# Patient Record
Sex: FEMALE | Race: ASIAN | NOT HISPANIC OR LATINO | Employment: FULL TIME | URBAN - METROPOLITAN AREA
[De-identification: names, ages, dates, MRNs, and addresses within clinical notes are randomized per-mention and may not be internally consistent; named-entity substitution may affect disease eponyms.]

---

## 2017-01-23 ENCOUNTER — PRENATAL OFFICE VISIT - HEALTHEAST (OUTPATIENT)
Dept: MIDWIFE SERVICES | Facility: CLINIC | Age: 27
End: 2017-01-23

## 2017-01-23 DIAGNOSIS — Z34.80 ENCOUNTER FOR SUPERVISION OF OTHER NORMAL PREGNANCY: ICD-10-CM

## 2017-01-23 ASSESSMENT — MIFFLIN-ST. JEOR: SCORE: 1127.78

## 2017-01-25 LAB — SYPHILIS RPR SCREEN - HISTORICAL: NORMAL

## 2017-02-15 ENCOUNTER — PRENATAL OFFICE VISIT - HEALTHEAST (OUTPATIENT)
Dept: MIDWIFE SERVICES | Facility: CLINIC | Age: 27
End: 2017-02-15

## 2017-02-15 DIAGNOSIS — O09.899 SHORT INTERVAL BETWEEN PREGNANCIES AFFECTING PREGNANCY, ANTEPARTUM: ICD-10-CM

## 2017-02-15 DIAGNOSIS — Z34.80 ENCOUNTER FOR SUPERVISION OF OTHER NORMAL PREGNANCY: ICD-10-CM

## 2017-02-15 ASSESSMENT — MIFFLIN-ST. JEOR: SCORE: 1145.92

## 2017-02-27 ENCOUNTER — PRENATAL OFFICE VISIT - HEALTHEAST (OUTPATIENT)
Dept: MIDWIFE SERVICES | Facility: CLINIC | Age: 27
End: 2017-02-27

## 2017-02-27 DIAGNOSIS — Z34.80 ENCOUNTER FOR SUPERVISION OF OTHER NORMAL PREGNANCY: ICD-10-CM

## 2017-02-27 ASSESSMENT — MIFFLIN-ST. JEOR: SCORE: 1145.92

## 2017-03-13 ENCOUNTER — PRENATAL OFFICE VISIT - HEALTHEAST (OUTPATIENT)
Dept: MIDWIFE SERVICES | Facility: CLINIC | Age: 27
End: 2017-03-13

## 2017-03-13 DIAGNOSIS — Z34.80 ENCOUNTER FOR SUPERVISION OF OTHER NORMAL PREGNANCY: ICD-10-CM

## 2017-03-13 ASSESSMENT — MIFFLIN-ST. JEOR: SCORE: 1164.07

## 2017-03-23 ENCOUNTER — PRENATAL OFFICE VISIT - HEALTHEAST (OUTPATIENT)
Dept: MIDWIFE SERVICES | Facility: CLINIC | Age: 27
End: 2017-03-23

## 2017-03-23 DIAGNOSIS — O09.899 SHORT INTERVAL BETWEEN PREGNANCIES AFFECTING PREGNANCY, ANTEPARTUM: ICD-10-CM

## 2017-03-23 DIAGNOSIS — Z34.83 ENCOUNTER FOR SUPERVISION OF OTHER NORMAL PREGNANCY, THIRD TRIMESTER: ICD-10-CM

## 2017-03-23 ASSESSMENT — MIFFLIN-ST. JEOR: SCORE: 1157.26

## 2017-04-03 ENCOUNTER — PRENATAL OFFICE VISIT - HEALTHEAST (OUTPATIENT)
Dept: MIDWIFE SERVICES | Facility: CLINIC | Age: 27
End: 2017-04-03

## 2017-04-03 DIAGNOSIS — Z34.83 ENCOUNTER FOR SUPERVISION OF OTHER NORMAL PREGNANCY, THIRD TRIMESTER: ICD-10-CM

## 2017-04-03 ASSESSMENT — MIFFLIN-ST. JEOR: SCORE: 1175.41

## 2017-04-10 ENCOUNTER — PRENATAL OFFICE VISIT - HEALTHEAST (OUTPATIENT)
Dept: MIDWIFE SERVICES | Facility: CLINIC | Age: 27
End: 2017-04-10

## 2017-04-10 DIAGNOSIS — Z34.80 ENCOUNTER FOR SUPERVISION OF OTHER NORMAL PREGNANCY: ICD-10-CM

## 2017-04-10 ASSESSMENT — MIFFLIN-ST. JEOR: SCORE: 1167.47

## 2017-04-17 ENCOUNTER — PRENATAL OFFICE VISIT - HEALTHEAST (OUTPATIENT)
Dept: MIDWIFE SERVICES | Facility: CLINIC | Age: 27
End: 2017-04-17

## 2017-04-17 DIAGNOSIS — Z34.83 ENCOUNTER FOR SUPERVISION OF OTHER NORMAL PREGNANCY, THIRD TRIMESTER: ICD-10-CM

## 2017-04-17 ASSESSMENT — MIFFLIN-ST. JEOR: SCORE: 1177.67

## 2018-05-07 ENCOUNTER — PRENATAL OFFICE VISIT - HEALTHEAST (OUTPATIENT)
Dept: FAMILY MEDICINE | Facility: CLINIC | Age: 28
End: 2018-05-07

## 2018-05-07 DIAGNOSIS — N92.6 ABNORMAL MENSES: ICD-10-CM

## 2018-05-07 DIAGNOSIS — Z32.01 POSITIVE PREGNANCY TEST: ICD-10-CM

## 2018-05-07 DIAGNOSIS — Z34.90 NORMAL PREGNANCY: ICD-10-CM

## 2018-05-07 DIAGNOSIS — R87.619 ABNORMAL PAP SMEAR OF CERVIX: ICD-10-CM

## 2018-05-07 LAB
ALBUMIN UR-MCNC: NEGATIVE MG/DL
AMPHETAMINES UR QL SCN: NORMAL
BARBITURATES UR QL: NORMAL
BASOPHILS # BLD AUTO: 0 THOU/UL (ref 0–0.2)
BASOPHILS NFR BLD AUTO: 0 % (ref 0–2)
BENZODIAZ UR QL: NORMAL
CANNABINOIDS UR QL SCN: NORMAL
COCAINE UR QL: NORMAL
CREAT UR-MCNC: 34.3 MG/DL
EOSINOPHIL # BLD AUTO: 0.1 THOU/UL (ref 0–0.4)
EOSINOPHIL NFR BLD AUTO: 1 % (ref 0–6)
ERYTHROCYTE [DISTWIDTH] IN BLOOD BY AUTOMATED COUNT: 13 % (ref 11–14.5)
GLUCOSE UR STRIP-MCNC: NEGATIVE MG/DL
HCG UR QL: POSITIVE
HCT VFR BLD AUTO: 35 % (ref 35–47)
HGB BLD-MCNC: 11.5 G/DL (ref 12–16)
HIV 1+2 AB+HIV1 P24 AG SERPL QL IA: NEGATIVE
KETONES UR STRIP-MCNC: NEGATIVE MG/DL
LYMPHOCYTES # BLD AUTO: 3.1 THOU/UL (ref 0.8–4.4)
LYMPHOCYTES NFR BLD AUTO: 35 % (ref 20–40)
MCH RBC QN AUTO: 28.8 PG (ref 27–34)
MCHC RBC AUTO-ENTMCNC: 32.9 G/DL (ref 32–36)
MCV RBC AUTO: 88 FL (ref 80–100)
MONOCYTES # BLD AUTO: 0.5 THOU/UL (ref 0–0.9)
MONOCYTES NFR BLD AUTO: 6 % (ref 2–10)
NEUTROPHILS # BLD AUTO: 5 THOU/UL (ref 2–7.7)
NEUTROPHILS NFR BLD AUTO: 58 % (ref 50–70)
OPIATES UR QL SCN: NORMAL
OXYCODONE UR QL: NORMAL
PCP UR QL SCN: NORMAL
PLATELET # BLD AUTO: 268 THOU/UL (ref 140–440)
PMV BLD AUTO: 9.6 FL (ref 8.5–12.5)
RBC # BLD AUTO: 4 MILL/UL (ref 3.8–5.4)
WBC: 8.7 THOU/UL (ref 4–11)

## 2018-05-07 ASSESSMENT — MIFFLIN-ST. JEOR: SCORE: 1032.52

## 2018-05-08 ENCOUNTER — AMBULATORY - HEALTHEAST (OUTPATIENT)
Dept: ADMINISTRATIVE | Facility: CLINIC | Age: 28
End: 2018-05-08

## 2018-05-08 DIAGNOSIS — Z32.01 POSITIVE PREGNANCY TEST: ICD-10-CM

## 2018-05-08 DIAGNOSIS — Z34.90 NORMAL PREGNANCY: ICD-10-CM

## 2018-05-08 LAB
ABO/RH(D): NORMAL
ABORH REPEAT: NORMAL
ANTIBODY SCREEN: NEGATIVE
BACTERIA SPEC CULT: NO GROWTH
COLLECTION METHOD: NORMAL
HBV SURFACE AG SERPL QL IA: NEGATIVE
LEAD BLD-MCNC: NORMAL UG/DL
LEAD RETEST: NO
RUBV IGG SERPL QL IA: POSITIVE
T PALLIDUM AB SER QL: NEGATIVE

## 2018-05-09 LAB
GUARDIAN FIRST NAME: NORMAL
GUARDIAN LAST NAME: NORMAL
HEALTH CARE PROVIDER CITY: NORMAL
HEALTH CARE PROVIDER NAME: NORMAL
HEALTH CARE PROVIDER PHONE: NORMAL
HEALTH CARE PROVIDER STATE: NORMAL
HEALTH CARE PROVIDER STREET ADDRESS: NORMAL
HEALTH CARE PROVIDER ZIP CODE: NORMAL
LEAD, B: 1.2 MCG/DL (ref 0–4.9)
PATIENT CITY: NORMAL
PATIENT COUNTY: NORMAL
PATIENT EMPLOYER: NORMAL
PATIENT ETHNICITY: NORMAL
PATIENT HOME PHONE: NORMAL
PATIENT OCCUPATION: NORMAL
PATIENT RACE: NORMAL
PATIENT STATE: NORMAL
PATIENT STREET ADDRESS: NORMAL
PATIENT ZIP CODE: NORMAL
SUBMITTING LABORATORY PHONE: NORMAL
VENOUS/CAPILLARY: NORMAL

## 2018-06-04 ENCOUNTER — PRENATAL OFFICE VISIT - HEALTHEAST (OUTPATIENT)
Dept: FAMILY MEDICINE | Facility: CLINIC | Age: 28
End: 2018-06-04

## 2018-06-04 DIAGNOSIS — Z34.92 NORMAL PREGNANCY IN SECOND TRIMESTER: ICD-10-CM

## 2018-06-04 LAB
ALBUMIN UR-MCNC: NEGATIVE MG/DL
GLUCOSE UR STRIP-MCNC: NEGATIVE MG/DL
KETONES UR STRIP-MCNC: NEGATIVE MG/DL

## 2018-06-04 ASSESSMENT — MIFFLIN-ST. JEOR: SCORE: 1055.2

## 2018-06-19 ENCOUNTER — COMMUNICATION - HEALTHEAST (OUTPATIENT)
Dept: FAMILY MEDICINE | Facility: CLINIC | Age: 28
End: 2018-06-19

## 2018-07-02 ENCOUNTER — PRENATAL OFFICE VISIT - HEALTHEAST (OUTPATIENT)
Dept: FAMILY MEDICINE | Facility: CLINIC | Age: 28
End: 2018-07-02

## 2018-07-02 DIAGNOSIS — Z34.92 SUPERVISION OF NORMAL PREGNANCY IN SECOND TRIMESTER: ICD-10-CM

## 2018-08-06 ENCOUNTER — PRENATAL OFFICE VISIT - HEALTHEAST (OUTPATIENT)
Dept: FAMILY MEDICINE | Facility: CLINIC | Age: 28
End: 2018-08-06

## 2018-08-06 DIAGNOSIS — Z34.92 ENCOUNTER FOR SUPERVISION OF NORMAL PREGNANCY IN SECOND TRIMESTER: ICD-10-CM

## 2018-08-06 LAB
ALBUMIN UR-MCNC: NEGATIVE MG/DL
ERYTHROCYTE [DISTWIDTH] IN BLOOD BY AUTOMATED COUNT: 12.9 % (ref 11–14.5)
FASTING STATUS PATIENT QL REPORTED: YES
GLUCOSE 1H P 50 G GLC PO SERPL-MCNC: 109 MG/DL (ref 70–139)
GLUCOSE UR STRIP-MCNC: NEGATIVE MG/DL
HCT VFR BLD AUTO: 34.9 % (ref 35–47)
HGB BLD-MCNC: 11.9 G/DL (ref 12–16)
KETONES UR STRIP-MCNC: NEGATIVE MG/DL
MCH RBC QN AUTO: 29.8 PG (ref 27–34)
MCHC RBC AUTO-ENTMCNC: 34 G/DL (ref 32–36)
MCV RBC AUTO: 88 FL (ref 80–100)
PLATELET # BLD AUTO: 262 THOU/UL (ref 140–440)
PMV BLD AUTO: 6.8 FL (ref 7–10)
RBC # BLD AUTO: 3.99 MILL/UL (ref 3.8–5.4)
WBC: 10.2 THOU/UL (ref 4–11)

## 2018-08-07 ENCOUNTER — COMMUNICATION - HEALTHEAST (OUTPATIENT)
Dept: FAMILY MEDICINE | Facility: CLINIC | Age: 28
End: 2018-08-07

## 2018-08-08 LAB — T PALLIDUM AB SER QL: NEGATIVE

## 2018-09-10 ENCOUNTER — PRENATAL OFFICE VISIT - HEALTHEAST (OUTPATIENT)
Dept: FAMILY MEDICINE | Facility: CLINIC | Age: 28
End: 2018-09-10

## 2018-09-10 DIAGNOSIS — Z34.92 ENCOUNTER FOR SUPERVISION OF NORMAL PREGNANCY IN SECOND TRIMESTER: ICD-10-CM

## 2018-09-10 LAB
ALBUMIN UR-MCNC: ABNORMAL MG/DL
GLUCOSE UR STRIP-MCNC: NEGATIVE MG/DL
KETONES UR STRIP-MCNC: NEGATIVE MG/DL

## 2018-09-24 ENCOUNTER — PRENATAL OFFICE VISIT - HEALTHEAST (OUTPATIENT)
Dept: FAMILY MEDICINE | Facility: CLINIC | Age: 28
End: 2018-09-24

## 2018-09-24 DIAGNOSIS — Z34.92 ENCOUNTER FOR SUPERVISION OF NORMAL PREGNANCY IN SECOND TRIMESTER: ICD-10-CM

## 2018-10-08 ENCOUNTER — PRENATAL OFFICE VISIT - HEALTHEAST (OUTPATIENT)
Dept: FAMILY MEDICINE | Facility: CLINIC | Age: 28
End: 2018-10-08

## 2018-10-08 DIAGNOSIS — Z34.92 ENCOUNTER FOR SUPERVISION OF NORMAL PREGNANCY IN SECOND TRIMESTER: ICD-10-CM

## 2018-10-10 LAB
ALLERGIC TO PENICILLIN: NO
GP B STREP DNA SPEC QL NAA+PROBE: NEGATIVE

## 2018-10-15 ENCOUNTER — PRENATAL OFFICE VISIT - HEALTHEAST (OUTPATIENT)
Dept: FAMILY MEDICINE | Facility: CLINIC | Age: 28
End: 2018-10-15

## 2018-10-15 DIAGNOSIS — Z34.92 ENCOUNTER FOR SUPERVISION OF NORMAL PREGNANCY IN SECOND TRIMESTER: ICD-10-CM

## 2018-10-22 ENCOUNTER — PRENATAL OFFICE VISIT - HEALTHEAST (OUTPATIENT)
Dept: FAMILY MEDICINE | Facility: CLINIC | Age: 28
End: 2018-10-22

## 2018-10-22 DIAGNOSIS — Z34.92 ENCOUNTER FOR SUPERVISION OF NORMAL PREGNANCY IN SECOND TRIMESTER: ICD-10-CM

## 2018-10-29 ENCOUNTER — PRENATAL OFFICE VISIT - HEALTHEAST (OUTPATIENT)
Dept: FAMILY MEDICINE | Facility: CLINIC | Age: 28
End: 2018-10-29

## 2018-10-29 DIAGNOSIS — Z34.92 ENCOUNTER FOR SUPERVISION OF NORMAL PREGNANCY IN SECOND TRIMESTER: ICD-10-CM

## 2018-11-06 ENCOUNTER — PRENATAL OFFICE VISIT - HEALTHEAST (OUTPATIENT)
Dept: FAMILY MEDICINE | Facility: CLINIC | Age: 28
End: 2018-11-06

## 2018-11-06 DIAGNOSIS — Z34.92 ENCOUNTER FOR SUPERVISION OF NORMAL PREGNANCY IN SECOND TRIMESTER: ICD-10-CM

## 2021-05-30 VITALS — BODY MASS INDEX: 24.35 KG/M2 | WEIGHT: 116 LBS | HEIGHT: 58 IN

## 2021-05-30 VITALS — WEIGHT: 118.5 LBS | BODY MASS INDEX: 24.87 KG/M2 | HEIGHT: 58 IN

## 2021-05-30 VITALS — HEIGHT: 58 IN | WEIGHT: 123 LBS | BODY MASS INDEX: 25.82 KG/M2

## 2021-05-30 VITALS — HEIGHT: 58 IN | BODY MASS INDEX: 24.35 KG/M2 | WEIGHT: 116 LBS

## 2021-05-30 VITALS — HEIGHT: 58 IN | BODY MASS INDEX: 25.19 KG/M2 | WEIGHT: 120 LBS

## 2021-05-30 VITALS — HEIGHT: 58 IN | WEIGHT: 120.75 LBS | BODY MASS INDEX: 25.35 KG/M2

## 2021-05-30 VITALS — HEIGHT: 58 IN | WEIGHT: 122.5 LBS | BODY MASS INDEX: 25.72 KG/M2

## 2021-05-30 VITALS — WEIGHT: 112 LBS | BODY MASS INDEX: 23.51 KG/M2 | HEIGHT: 58 IN

## 2021-06-01 VITALS — BODY MASS INDEX: 21.53 KG/M2 | WEIGHT: 103 LBS

## 2021-06-01 VITALS — BODY MASS INDEX: 23.03 KG/M2 | WEIGHT: 110.2 LBS

## 2021-06-01 VITALS — HEIGHT: 58 IN | WEIGHT: 91 LBS | BODY MASS INDEX: 19.1 KG/M2

## 2021-06-01 VITALS — WEIGHT: 96 LBS | BODY MASS INDEX: 20.15 KG/M2 | HEIGHT: 58 IN

## 2021-06-02 VITALS — BODY MASS INDEX: 24.24 KG/M2 | WEIGHT: 116 LBS

## 2021-06-02 VITALS — BODY MASS INDEX: 25.08 KG/M2 | WEIGHT: 120 LBS

## 2021-06-02 VITALS — BODY MASS INDEX: 24.66 KG/M2 | WEIGHT: 118 LBS

## 2021-06-02 VITALS — WEIGHT: 113.4 LBS | BODY MASS INDEX: 23.7 KG/M2

## 2021-06-02 VITALS — WEIGHT: 122 LBS | BODY MASS INDEX: 25.5 KG/M2

## 2021-06-02 VITALS — BODY MASS INDEX: 24.87 KG/M2 | WEIGHT: 119 LBS

## 2021-06-08 NOTE — PROGRESS NOTES
Arnie Peguero presents to clinic today for a routine prenatal visit. Feeling well overall.  Reports + fetal movement daily, reviewed fetal movement awareness and when to call. Denies any regular contractions, bleeding or LOF. Reports good appetite, eating small frequent meals and is hydrating adequately.  One hour GCT, hgb, RPR and UC today as it has not yet been collected in this pregnancy.  TDAP discussed, accepted and given today.  Has started getting things ready at home, has car seat.  Warning sxs and when to call reviewed, patient has CNM contact information.  Anticipatory guidance re: third trimester provided today.

## 2021-06-08 NOTE — PROGRESS NOTES
Arnie Peguero is here with professional  for a routine prenatal visit at 30w5d.  She has no concerns and is feeling well.  She is feeling fetal movement regularly. Denies vaginal bleeding/loss of fluid. Fetal maturity and expectations for fetal movement in the third trimester, how and when to do fetal kick counts and when to call CNM discussed. Appetite is normal. Interval weight gain is appropriate.  Reviewed weight gain chart with patient.  Reviewed 28 week lab results.  TdaP received at previous visit.  Pre-registration completed with CNM and .  Reviewed and provided patient with handouts.  Anticipatory guidance, warning signs (with emphasis on  labor signs and symptoms), when to call and CNM contact information reviewed.  RTC in 2 weeks.

## 2021-06-09 NOTE — PROGRESS NOTES
Arnie Peguero presents to clinic today by herself. With the assistance of a professional [facility]  she voices that she is feeling generally well and has no concerns. She denies any regular uterine contractions, vaginal bleeding, loss of fluid or changes in fetal movement. Reviewed no weight gain since her last visit. Arnie Peguero states she is eating 2-3 meals a day. Encouraged her to eat smaller and more frequent meals to assist with her overall weight gain. Total weight gain this pregnancy is appropriate for her pre-pregnancy BMI of 19.6. 32 week pregnancy checklist completed and 36 week checklist initiated. Reviewed sxs of  labor, danger signs and numbers to call. Arnie Peguero instructed to return to clinic in 2 weeks or sooner with any questions or concerns.    Patient was seen with student, JOE Arteaga who was present for learning. I personally assessed, examined and made clinical decisions reflected in the documentation. FELICITY Campos,CNM

## 2021-06-09 NOTE — PROGRESS NOTES
Arnie is here for a SPENCER visit accompanied by a [facility] . We reviewed her delivery preferences. States she will combination feed and she has a breast pump at home. She nursed her first baby for 1 week. She hopes to work until the end of March - 'sits and stands at work but no heavy lifting. Doesn't know name of company in [facility].' GBS culture and hemoglobin done. CX posterior, 1 cm, 30%, medium consistency, vertex -2 station - suture palpated. Encouraged slightly increased rest until 37 weeks. Given bottle of PNV to take 1 po daily. Discussed BC briefly and needs more explanation at future visit. Baby girl at home is 11 months old. She was born @ 41 wks and weighed 5# 7oz.  Hgb done today = 12.2 up from 11.6.

## 2021-06-09 NOTE — PROGRESS NOTES
Arnie Peguero presents to clinic today for a routine prenatal visit. Professional [facility]  present for today's visit.  Feeling well.  Reports + fetal movement daily. Denies any regular contractions, bleeding or LOF. Reports good appetite, eating smaller more frequent meals and is hydrating adequately, good interval and overall weight gain. Reviewed GBS, hgb and cervical exam at next visit, questions answered.  Has car seat for baby at home. Warning sxs and when to call reviewed, patient has CNM contact information.  Anticipatory guidance re: third trimester provided today.

## 2021-06-09 NOTE — PROGRESS NOTES
Arnie Peguero is here for SPENCER at 37w3d. Here with professional . Offers no complaints or questions today. Active fetal movement. Denies contractions, leaking water, vaginal bleeding. S=D. TWG appropriate. Reviewed 36 weeks labs: GBS neg and Hgb 12.2. Encouraged daily FMCs. Labor signs reviewed. Hoping for 24 hour hospital stay and will have 3 months off of work after baby comes. RTC 1 week.

## 2021-06-10 NOTE — PROGRESS NOTES
Arnie Pegeuro presents to clinic today for a routine prenatal visit. Professional [facility]  present for visit today. Feeling well, no concerns.  Reports + fetal movement daily. Denies any regular contractions, bleeding or LOF. Reports good appetite, eating regularly and hydrating adequately.  Had previous baby at 41+ weeks gestation, so not expecting labor anytime soon. Ready at home for baby.  Warning sxs and when to call reviewed, patient has CNM contact information.  Anticipatory guidance re: full term pregnancy provided today.

## 2021-06-10 NOTE — PROGRESS NOTES
Arnie Peguero is here with a professional  for a SPENCER 39 wk visit. Feeling well and feeling ready for baby at home.  First daughter will turn 2 yo 4/28/17. Will have lots of PP help at home. Mother in law, sister in law and .  Looked at wt gain chart--doing well. Reviewed labor instructions, [facility] reviewed. Doesn't know about which BCM yet.

## 2021-06-16 ENCOUNTER — COMMUNICATION - HEALTHEAST (OUTPATIENT)
Dept: FAMILY MEDICINE | Facility: CLINIC | Age: 31
End: 2021-06-16

## 2021-06-17 NOTE — PROGRESS NOTES
Chief Complaint:  Chief Complaint   Patient presents with     Pregnancy Confirmation     #3     HPI:   Arnie Peguero is a 27 y.o. female is here for pregnancy intake.  She is .  Patient's last menstrual period was 2018 (exact date).  Saw midwives for previous pregnancies.  Positive home pregnancy test in 2018.  history of abnormal pap in , no follow-up that I can find.    Past medical history: reviewed and updated.  Past Medical History:   Diagnosis Date     Abnormal Pap smear of cervix      First degree perineal laceration 2016     HPV (human papilloma virus) infection       (normal spontaneous vaginal delivery) 2016     Right lower lobe pneumonia 2016    Most likely community-acquired pneumonia: Azithromycin by mouth (Z-pack)     Urinary tract infection      No past surgical history on file.    Current Outpatient Prescriptions:      prenatal vit-iron fum-folic ac (PRENATAL VITAMIN) 27 mg iron- 0.8 mg Tab, Take 1 tablet by mouth once daily., Disp: 100 tablet, Rfl: 3    Social:  Social History   Substance Use Topics     Smoking status: Never Smoker     Smokeless tobacco: Never Used     Alcohol use No       OBJECTIVE:  No Known Allergies  Vitals:    18 1453   BP: 108/60   Pulse: 70   SpO2: 99%     Body mass index is 19.02 kg/(m^2).    Vital signs stable as recorded above  General: Patient is alert and oriented x 3, in no apparent distress  Fetal Exam: Fundal height was not palpable, fetal heart tones are heard at 145 bpm.    Results:  Results for orders placed or performed in visit on 18   Pregnancy (Beta-hCG, Qual), Urine   Result Value Ref Range    Pregnancy Test, Urine Positive (!) Negative   Urinalysis, OB Screen   Result Value Ref Range    Glucose, UA Negative Negative    Ketones, UA Negative Negative    Protein, UA Negative Negative mg/dL     Other screening pregnancy lab work is ordered and pending.    Patient scored a 8/30 on [place]  Depression  Screen.    Assessment and Plan:  1. Pregnancy Intake Appointment.  Arnie Peguero is 27 y.o. and .  Patient's last menstrual period was 2018 (exact date).  Estimated Date of Delivery: 18  She will be seeing Dr. German for OB care.  Screening pregnancy lab work was drawn.  Prenatal vitamins prescribed.  Problem list and current medications reviewed and updated.  Dating ultrasound ordered.  Prenatal info packet given.    2. Abnormal pap in 2016.  LSIL/mild dysplasia/KAITLIN I.  No follow-up that I can find in her chart.  Review with OB doc at First OB visit.    Follow up in 3 weeks.  Please see OB Episode for complete details.  Visit was approximately 40 minutes, greater than 50% of time spent in face-to-face counseling and coordination of care.    This dictation uses voice recognition software, which may contain typographical errors.

## 2021-06-18 NOTE — PROGRESS NOTES
First OB   Feeling well.   No Nausea  Good Appetite  No pelvic pain  No other concerns : none     Taking PNV: no, will start now!    No ctx, lof, bleeding, or discharge.  No HA or vision changes.      Labs/imaging reviewed: dating changed to 15 week US.   Discussed screening for aneuploidy and neural tube defects.  Declines.     Preformed physical exam : declines pelvic  Reviewed MARCO, past medical history, past surgical history, family history, genetic history, and previous obstetrical history.    Encouraged good nutrition, well balanced diet, regular activity.  Discussed foods to avoid.  And other applicable safety/health risks in pregnancy.    Hx of LGSIL: would like to wait on pap smear andre German

## 2021-06-19 NOTE — PROGRESS NOTES
Please call patient and inform:  Glucose test was negative. No signs of gestational diabetes. Hemoglobin is 11.9, basically normal- keep up the good work!

## 2021-06-19 NOTE — PROGRESS NOTES
No ctx, lof, bleeding, or discharge.  No HA or vision changes.  Good FM : yes    Labs/imaging reviewed: 20 wk US wnl, having a girl. 2 girls at home.   FOB: Ana Maria    No questions or concerns. F/u in 4 weeks- 1 hr GTT to be done at next visit.     Paola German

## 2021-06-19 NOTE — PROGRESS NOTES
No ctx, lof, bleeding, or discharge.  No HA or vision changes.  Good FM : yes  No questions or concerns today.    Plan today:   1 hour GGT, CBC and RPR  Discussed Tdap she would like to do that at the next visit  Would like to breast-feed for 1 month until she goes back to work.  Follow-up in 4 weeks.    Paola German

## 2021-06-20 NOTE — PROGRESS NOTES
No ctx, lof, bleeding, or discharge.  No HA or vision changes.  Good FM : yes  Asking for registration form for the hospital.    Plan today:   Discussed baby meds  GBS swab collected  SVE as above: Closed thick and high  Follow-up in 1 week    Paola German

## 2021-06-20 NOTE — PROGRESS NOTES
No ctx, lof, bleeding, or discharge.  No HA or vision changes.  Good FM : yes    Plan today:   tdap today  Natural labor  PPBC: undecided. Will discuss with .   Reviewed signs of  labor.   F/u in 2 weeks.     Handout given    Paola German

## 2021-06-20 NOTE — PROGRESS NOTES
No ctx, lof, bleeding, or discharge.  No HA or vision changes.  Good FM : yes  No questions or concerns today.     Plan today:   Measuring well.  Fetus feels vertex.  Follow-up in 2 weeks.  Discussed GBS swab to be done at next visit.    Paola German

## 2021-06-21 NOTE — PROGRESS NOTES
No ctx, lof, bleeding, or discharge.  No HA or vision changes.  Good FM : yes  No questions or concerns today    Plan today:   SVE as noted: Membranes swept with patient's permission.  Discussed my vacation schedule this week and on-call coverage.  Follow-up in 1 week.    Paola German

## 2021-06-21 NOTE — PROGRESS NOTES
No lof, bleeding, or discharge.  No HA or vision changes.  Good FM : yes  Had some contractions last night but they stopped today.      Plan today:   SVE as above: Did attempt to sweep membranes again today.  Discussed induction versus watchful waiting if goes postdates: She would like to wait for induction until 42 weeks.  We discussed risk increases between 41-42 weeks and need for  testing twice weekly.  We will set up BPP/ NST on Friday in case she is still pregnant.  She then has follow-up with me next Tuesday and we can do NST in clinic if needed.  Questions answered to patient's satisfaction.  Discussed methods of induction.    Paola German

## 2021-06-25 NOTE — TELEPHONE ENCOUNTER
New Appointment Needed  What is the reason for the visit:  Confirmation of pregnancy   Pregnancy Confirmation Appt Needed  When was the first day of your last menstrual cycle?: March but does not know that day   Have you done a home pregnancy test?: Yes, positive   Provider Preference: Maxi  How soon do you need to be seen?:ASAP on Friday if possible  Okay to leave a detailed message:Yes Needs ong

## 2021-06-26 NOTE — TELEPHONE ENCOUNTER
Left message #1 at [phone]. Postponing task out to a week and will try again. If patient returns call back, please help patient schedule an appointment per message below. Thanks!

## 2021-06-26 NOTE — TELEPHONE ENCOUNTER
Left message #2 at [phone]. Sending letter out and postponing task out to 2 weeks and will try again if an appointment hasn't been made. If patient returns call back, please help patient schedule an appointment per message below. Thanks!

## 2021-07-14 PROBLEM — Z34.90 PREGNANT: Status: RESOLVED | Noted: 2018-11-09 | Resolved: 2018-11-11

## 2021-07-14 PROBLEM — Z37.9 NORMAL LABOR: Status: RESOLVED | Noted: 2017-04-24 | Resolved: 2017-04-24

## 2021-07-14 PROBLEM — Z34.90 PREGNANT: Status: RESOLVED | Noted: 2017-04-24 | Resolved: 2017-04-24

## 2021-07-15 ENCOUNTER — TELEPHONE (OUTPATIENT)
Dept: FAMILY MEDICINE | Facility: CLINIC | Age: 31
End: 2021-07-15

## 2021-07-15 NOTE — TELEPHONE ENCOUNTER
Left message #3 at [phone]. If patient returns call back, please help patient schedule an appointment per message below. Thanks! We have made several attempts to contact patient by phone and letter to schedule an appointment. Unfortunately, our calls have not been returned and we were unable to schedule. At this time, we will no longer make an attempt to schedule this appointment. Completing task.

## 2021-07-15 NOTE — TELEPHONE ENCOUNTER
New appointment needed    Verito Silva 3 weeks ago        Left message #2 at [phone]. Sending letter out and postponing task out to 2 weeks and will try again if an appointment hasn't been made. If patient returns call back, please help patient schedule an appointment per message below. Thanks!            Documentation    Tea Neal 4 weeks ago   MY     Left message #1 at [phone]. Postponing task out to a week and will try again. If patient returns call back, please help patient schedule an appointment per message below. Thanks!            Documentation    Renetta Choudhary MA  [facility] Scheduling Registration Pool 4 weeks ago   DB     Please set appt          Documentation    Catherine Carbone routed conversation to [facility] Family Medicine/Ob Support Pool 4 weeks ago   Catherine Carbone 4 weeks ago        New Appointment Needed  What is the reason for the visit:  Confirmation of pregnancy   Pregnancy Confirmation Appt Needed  When was the first day of your last menstrual cycle?: March but does not know that day   Have you done a home pregnancy test?: Yes, positive   Provider Preference: Maxi  How soon do you need to be seen?:ASAP on Friday if possible  Okay to leave a detailed message:Yes Needs ong

## 2021-07-16 ENCOUNTER — TELEPHONE (OUTPATIENT)
Dept: FAMILY MEDICINE | Facility: CLINIC | Age: 31
End: 2021-07-16

## 2021-07-16 NOTE — TELEPHONE ENCOUNTER
New appointment needed    You routed conversation to [facility] Scheduling Registration Pool Yesterday (1:52 PM)   Verito Silva 3 weeks ago        Left message #2 at [phone]. Sending letter out and postponing task out to 2 weeks and will try again if an appointment hasn't been made. If patient returns call back, please help patient schedule an appointment per message below. Thanks!            Documentation    Tea Neal 4 weeks ago   MY     Left message #1 at [phone]. Postponing task out to a week and will try again. If patient returns call back, please help patient schedule an appointment per message below. Thanks!            Documentation    Renetta Choudhary MA  [facility] Scheduling Registration Pool 4 weeks ago   DB     Please set appt          Documentation    Catherine Carbone routed conversation to [facility] Family Medicine/Ob Support Pool 1 month ago   Catherine Carbone 1 month ago        New Appointment Needed  What is the reason for the visit:  Confirmation of pregnancy   Pregnancy Confirmation Appt Needed  When was the first day of your last menstrual cycle?: March but does not know that day   Have you done a home pregnancy test?: Yes, positive   Provider Preference: Maxi  How soon do you need to be seen?:ASAP on Friday if possible  Okay to leave a detailed message:Yes Needs ong

## 2021-07-16 NOTE — TELEPHONE ENCOUNTER
Left message #3 at [phone]. If patient returns call back, please help patient schedule an appointment per message below. Thanks! We have made several attempts to contact patient by phone and letter to schedule an appointment. Unfortunately, our calls have not been returned and we were unable to schedule. At this time, we will no longer make an attempt to schedule this appointment. Completing task.

## 2021-12-12 ENCOUNTER — HOSPITAL ENCOUNTER (INPATIENT)
Facility: HOSPITAL | Age: 31
LOS: 2 days | Discharge: HOME | End: 2021-12-14
Attending: OBSTETRICS & GYNECOLOGY | Admitting: OBSTETRICS & GYNECOLOGY
Payer: COMMERCIAL

## 2021-12-12 DIAGNOSIS — O48.0 41 WEEKS GESTATION OF PREGNANCY: Primary | ICD-10-CM

## 2021-12-12 DIAGNOSIS — K59.00 CONSTIPATION, UNSPECIFIED CONSTIPATION TYPE: ICD-10-CM

## 2021-12-12 DIAGNOSIS — Z3A.41 41 WEEKS GESTATION OF PREGNANCY: Primary | ICD-10-CM

## 2021-12-12 PROBLEM — Z34.90 PREGNANCY: Status: ACTIVE | Noted: 2021-12-12

## 2021-12-12 PROBLEM — Z36.89 ENCOUNTER FOR TRIAGE IN PREGNANT PATIENT: Status: ACTIVE | Noted: 2021-12-12

## 2021-12-12 LAB
ABO/RH(D): NORMAL
ANTIBODY SCREEN: NEGATIVE
BASOPHILS # BLD AUTO: 0.1 10E3/UL (ref 0–0.2)
BASOPHILS NFR BLD AUTO: 1 %
EOSINOPHIL # BLD AUTO: 0.1 10E3/UL (ref 0–0.7)
EOSINOPHIL NFR BLD AUTO: 1 %
ERYTHROCYTE [DISTWIDTH] IN BLOOD BY AUTOMATED COUNT: 13.6 % (ref 10–15)
HCT VFR BLD AUTO: 37.9 % (ref 35–47)
HGB BLD-MCNC: 12.6 G/DL (ref 11.7–15.7)
IMM GRANULOCYTES # BLD: 0.2 10E3/UL
IMM GRANULOCYTES NFR BLD: 2 %
LYMPHOCYTES # BLD AUTO: 2.8 10E3/UL (ref 0.8–5.3)
LYMPHOCYTES NFR BLD AUTO: 21 %
MCH RBC QN AUTO: 30 PG (ref 26.5–33)
MCHC RBC AUTO-ENTMCNC: 33.2 G/DL (ref 31.5–36.5)
MCV RBC AUTO: 90 FL (ref 78–100)
MONOCYTES # BLD AUTO: 1.1 10E3/UL (ref 0–1.3)
MONOCYTES NFR BLD AUTO: 8 %
NEUTROPHILS # BLD AUTO: 9.2 10E3/UL (ref 1.6–8.3)
NEUTROPHILS NFR BLD AUTO: 67 %
NRBC # BLD AUTO: 0 10E3/UL
NRBC BLD AUTO-RTO: 0 /100
PLATELET # BLD AUTO: 231 10E3/UL (ref 150–450)
RBC # BLD AUTO: 4.2 10E6/UL (ref 3.8–5.2)
SARS-COV-2 RNA RESP QL NAA+PROBE: NEGATIVE
SPECIMEN EXPIRATION DATE: NORMAL
WBC # BLD AUTO: 13.1 10E3/UL (ref 4–11)

## 2021-12-12 PROCEDURE — 250N000009 HC RX 250: Performed by: OBSTETRICS & GYNECOLOGY

## 2021-12-12 PROCEDURE — 87635 SARS-COV-2 COVID-19 AMP PRB: CPT | Performed by: OBSTETRICS & GYNECOLOGY

## 2021-12-12 PROCEDURE — 85025 COMPLETE CBC W/AUTO DIFF WBC: CPT | Performed by: OBSTETRICS & GYNECOLOGY

## 2021-12-12 PROCEDURE — 86780 TREPONEMA PALLIDUM: CPT | Performed by: OBSTETRICS & GYNECOLOGY

## 2021-12-12 PROCEDURE — 120N000001 HC R&B MED SURG/OB

## 2021-12-12 PROCEDURE — 36415 COLL VENOUS BLD VENIPUNCTURE: CPT | Performed by: OBSTETRICS & GYNECOLOGY

## 2021-12-12 PROCEDURE — 86900 BLOOD TYPING SEROLOGIC ABO: CPT | Performed by: OBSTETRICS & GYNECOLOGY

## 2021-12-12 RX ORDER — ONDANSETRON 2 MG/ML
4 INJECTION INTRAMUSCULAR; INTRAVENOUS EVERY 6 HOURS PRN
Status: DISCONTINUED | OUTPATIENT
Start: 2021-12-12 | End: 2021-12-13 | Stop reason: HOSPADM

## 2021-12-12 RX ORDER — IBUPROFEN 600 MG/1
600 TABLET, FILM COATED ORAL
Status: DISCONTINUED | OUTPATIENT
Start: 2021-12-12 | End: 2021-12-14 | Stop reason: HOSPADM

## 2021-12-12 RX ORDER — KETOROLAC TROMETHAMINE 30 MG/ML
30 INJECTION, SOLUTION INTRAMUSCULAR; INTRAVENOUS
Status: DISCONTINUED | OUTPATIENT
Start: 2021-12-12 | End: 2021-12-14 | Stop reason: HOSPADM

## 2021-12-12 RX ORDER — OXYTOCIN 10 [USP'U]/ML
INJECTION, SOLUTION INTRAMUSCULAR; INTRAVENOUS
Status: DISCONTINUED
Start: 2021-12-12 | End: 2021-12-12 | Stop reason: WASHOUT

## 2021-12-12 RX ORDER — NALOXONE HYDROCHLORIDE 0.4 MG/ML
0.4 INJECTION, SOLUTION INTRAMUSCULAR; INTRAVENOUS; SUBCUTANEOUS
Status: DISCONTINUED | OUTPATIENT
Start: 2021-12-12 | End: 2021-12-13 | Stop reason: HOSPADM

## 2021-12-12 RX ORDER — FENTANYL CITRATE 50 UG/ML
50-100 INJECTION, SOLUTION INTRAMUSCULAR; INTRAVENOUS
Status: DISCONTINUED | OUTPATIENT
Start: 2021-12-12 | End: 2021-12-13 | Stop reason: HOSPADM

## 2021-12-12 RX ORDER — LIDOCAINE HYDROCHLORIDE 10 MG/ML
INJECTION, SOLUTION EPIDURAL; INFILTRATION; INTRACAUDAL; PERINEURAL
Status: DISCONTINUED
Start: 2021-12-12 | End: 2021-12-13 | Stop reason: WASHOUT

## 2021-12-12 RX ORDER — OXYTOCIN 10 [USP'U]/ML
10 INJECTION, SOLUTION INTRAMUSCULAR; INTRAVENOUS
Status: DISCONTINUED | OUTPATIENT
Start: 2021-12-12 | End: 2021-12-14 | Stop reason: HOSPADM

## 2021-12-12 RX ORDER — LIDOCAINE 40 MG/G
CREAM TOPICAL
Status: DISCONTINUED | OUTPATIENT
Start: 2021-12-12 | End: 2021-12-12 | Stop reason: HOSPADM

## 2021-12-12 RX ORDER — MISOPROSTOL 200 UG/1
800 TABLET ORAL
Status: DISCONTINUED | OUTPATIENT
Start: 2021-12-12 | End: 2021-12-13 | Stop reason: HOSPADM

## 2021-12-12 RX ORDER — NALOXONE HYDROCHLORIDE 0.4 MG/ML
0.2 INJECTION, SOLUTION INTRAMUSCULAR; INTRAVENOUS; SUBCUTANEOUS
Status: DISCONTINUED | OUTPATIENT
Start: 2021-12-12 | End: 2021-12-13 | Stop reason: HOSPADM

## 2021-12-12 RX ORDER — PROCHLORPERAZINE MALEATE 10 MG
10 TABLET ORAL EVERY 6 HOURS PRN
Status: DISCONTINUED | OUTPATIENT
Start: 2021-12-12 | End: 2021-12-13 | Stop reason: HOSPADM

## 2021-12-12 RX ORDER — ACETAMINOPHEN 325 MG/1
650 TABLET ORAL EVERY 4 HOURS PRN
Status: DISCONTINUED | OUTPATIENT
Start: 2021-12-12 | End: 2021-12-13 | Stop reason: HOSPADM

## 2021-12-12 RX ORDER — METOCLOPRAMIDE HYDROCHLORIDE 5 MG/ML
10 INJECTION INTRAMUSCULAR; INTRAVENOUS EVERY 6 HOURS PRN
Status: DISCONTINUED | OUTPATIENT
Start: 2021-12-12 | End: 2021-12-13 | Stop reason: HOSPADM

## 2021-12-12 RX ORDER — CARBOPROST TROMETHAMINE 250 UG/ML
250 INJECTION, SOLUTION INTRAMUSCULAR
Status: DISCONTINUED | OUTPATIENT
Start: 2021-12-12 | End: 2021-12-13 | Stop reason: HOSPADM

## 2021-12-12 RX ORDER — VITAMIN A ACETATE, .BETA.-CAROTENE, ASCORBIC ACID, CHOLECALCIFEROL, .ALPHA.-TOCOPHEROL ACETATE, DL-, THIAMINE MONONITRATE, RIBOFLAVIN, NIACINAMIDE, PYRIDOXINE HYDROCHLORIDE, FOLIC ACID, CYANOCOBALAMIN, CALCIUM CARBONATE, FERROUS FUMARATE, ZINC OXIDE, AND CUPRIC OXIDE 2000; 2000; 120; 400; 22; 1.84; 3; 20; 10; 1; 12; 200; 27; 25; 2 [IU]/1; [IU]/1; MG/1; [IU]/1; MG/1; MG/1; MG/1; MG/1; MG/1; MG/1; UG/1; MG/1; MG/1; MG/1; MG/1
1 TABLET ORAL DAILY
COMMUNITY

## 2021-12-12 RX ORDER — SODIUM CHLORIDE, SODIUM LACTATE, POTASSIUM CHLORIDE, CALCIUM CHLORIDE 600; 310; 30; 20 MG/100ML; MG/100ML; MG/100ML; MG/100ML
INJECTION, SOLUTION INTRAVENOUS CONTINUOUS PRN
Status: DISCONTINUED | OUTPATIENT
Start: 2021-12-12 | End: 2021-12-13 | Stop reason: HOSPADM

## 2021-12-12 RX ORDER — METOCLOPRAMIDE 10 MG/1
10 TABLET ORAL EVERY 6 HOURS PRN
Status: DISCONTINUED | OUTPATIENT
Start: 2021-12-12 | End: 2021-12-13 | Stop reason: HOSPADM

## 2021-12-12 RX ORDER — METHYLERGONOVINE MALEATE 0.2 MG/ML
200 INJECTION INTRAVENOUS
Status: DISCONTINUED | OUTPATIENT
Start: 2021-12-12 | End: 2021-12-13 | Stop reason: HOSPADM

## 2021-12-12 RX ORDER — LIDOCAINE HYDROCHLORIDE 20 MG/ML
SOLUTION OROPHARYNGEAL
Status: DISCONTINUED
Start: 2021-12-12 | End: 2021-12-13 | Stop reason: WASHOUT

## 2021-12-12 RX ORDER — MISOPROSTOL 200 UG/1
TABLET ORAL
Status: DISCONTINUED
Start: 2021-12-12 | End: 2021-12-13 | Stop reason: WASHOUT

## 2021-12-12 RX ORDER — MISOPROSTOL 200 UG/1
400 TABLET ORAL
Status: DISCONTINUED | OUTPATIENT
Start: 2021-12-12 | End: 2021-12-13 | Stop reason: HOSPADM

## 2021-12-12 RX ORDER — ONDANSETRON 4 MG/1
4 TABLET, ORALLY DISINTEGRATING ORAL EVERY 6 HOURS PRN
Status: DISCONTINUED | OUTPATIENT
Start: 2021-12-12 | End: 2021-12-13 | Stop reason: HOSPADM

## 2021-12-12 RX ORDER — OXYTOCIN/0.9 % SODIUM CHLORIDE 30/500 ML
340 PLASTIC BAG, INJECTION (ML) INTRAVENOUS CONTINUOUS PRN
Status: DISCONTINUED | OUTPATIENT
Start: 2021-12-12 | End: 2021-12-13 | Stop reason: HOSPADM

## 2021-12-12 RX ORDER — OXYTOCIN 10 [USP'U]/ML
10 INJECTION, SOLUTION INTRAMUSCULAR; INTRAVENOUS
Status: DISCONTINUED | OUTPATIENT
Start: 2021-12-12 | End: 2021-12-13 | Stop reason: HOSPADM

## 2021-12-12 RX ORDER — OXYTOCIN/0.9 % SODIUM CHLORIDE 30/500 ML
100-340 PLASTIC BAG, INJECTION (ML) INTRAVENOUS CONTINUOUS PRN
Status: DISCONTINUED | OUTPATIENT
Start: 2021-12-12 | End: 2021-12-14 | Stop reason: HOSPADM

## 2021-12-12 RX ORDER — PROCHLORPERAZINE 25 MG
25 SUPPOSITORY, RECTAL RECTAL EVERY 12 HOURS PRN
Status: DISCONTINUED | OUTPATIENT
Start: 2021-12-12 | End: 2021-12-13 | Stop reason: HOSPADM

## 2021-12-12 RX ADMIN — TRANEXAMIC ACID 1 G: 100 INJECTION, SOLUTION INTRAVENOUS at 23:35

## 2021-12-12 ASSESSMENT — MIFFLIN-ST. JEOR: SCORE: 1122.78

## 2021-12-13 PROCEDURE — 250N000013 HC RX MED GY IP 250 OP 250 PS 637: Performed by: OBSTETRICS & GYNECOLOGY

## 2021-12-13 PROCEDURE — 722N000001 HC LABOR CARE VAGINAL DELIVERY SINGLE

## 2021-12-13 PROCEDURE — 250N000011 HC RX IP 250 OP 636: Performed by: OBSTETRICS & GYNECOLOGY

## 2021-12-13 PROCEDURE — 10907ZC DRAINAGE OF AMNIOTIC FLUID, THERAPEUTIC FROM PRODUCTS OF CONCEPTION, VIA NATURAL OR ARTIFICIAL OPENING: ICD-10-PCS | Performed by: OBSTETRICS & GYNECOLOGY

## 2021-12-13 PROCEDURE — 250N000009 HC RX 250: Performed by: OBSTETRICS & GYNECOLOGY

## 2021-12-13 PROCEDURE — 120N000001 HC R&B MED SURG/OB

## 2021-12-13 RX ORDER — BISACODYL 10 MG
10 SUPPOSITORY, RECTAL RECTAL DAILY PRN
Status: DISCONTINUED | OUTPATIENT
Start: 2021-12-13 | End: 2021-12-14 | Stop reason: HOSPADM

## 2021-12-13 RX ORDER — IBUPROFEN 800 MG/1
800 TABLET, FILM COATED ORAL EVERY 6 HOURS PRN
Status: DISCONTINUED | OUTPATIENT
Start: 2021-12-13 | End: 2021-12-14 | Stop reason: HOSPADM

## 2021-12-13 RX ORDER — DOCUSATE SODIUM 100 MG/1
100 CAPSULE, LIQUID FILLED ORAL DAILY
Status: DISCONTINUED | OUTPATIENT
Start: 2021-12-13 | End: 2021-12-14 | Stop reason: HOSPADM

## 2021-12-13 RX ORDER — HYDROCORTISONE 2.5 %
CREAM (GRAM) TOPICAL 3 TIMES DAILY PRN
Status: DISCONTINUED | OUTPATIENT
Start: 2021-12-13 | End: 2021-12-14 | Stop reason: HOSPADM

## 2021-12-13 RX ORDER — MODIFIED LANOLIN
OINTMENT (GRAM) TOPICAL
Status: DISCONTINUED | OUTPATIENT
Start: 2021-12-13 | End: 2021-12-14 | Stop reason: HOSPADM

## 2021-12-13 RX ORDER — ACETAMINOPHEN 325 MG/1
650 TABLET ORAL EVERY 4 HOURS PRN
Status: DISCONTINUED | OUTPATIENT
Start: 2021-12-13 | End: 2021-12-14 | Stop reason: HOSPADM

## 2021-12-13 RX ADMIN — ACETAMINOPHEN 650 MG: 325 TABLET ORAL at 10:51

## 2021-12-13 RX ADMIN — IBUPROFEN 800 MG: 800 TABLET, FILM COATED ORAL at 20:21

## 2021-12-13 RX ADMIN — IBUPROFEN 800 MG: 800 TABLET, FILM COATED ORAL at 07:49

## 2021-12-13 RX ADMIN — KETOROLAC TROMETHAMINE 30 MG: 30 INJECTION, SOLUTION INTRAMUSCULAR; INTRAVENOUS at 01:40

## 2021-12-13 RX ADMIN — IBUPROFEN 800 MG: 800 TABLET, FILM COATED ORAL at 14:31

## 2021-12-13 RX ADMIN — ACETAMINOPHEN 650 MG: 325 TABLET ORAL at 21:59

## 2021-12-13 RX ADMIN — ACETAMINOPHEN 650 MG: 325 TABLET ORAL at 18:06

## 2021-12-13 RX ADMIN — DOCUSATE SODIUM 100 MG: 100 CAPSULE, LIQUID FILLED ORAL at 10:53

## 2021-12-13 RX ADMIN — Medication 340 ML/HR: at 00:36

## 2021-12-13 NOTE — PROGRESS NOTES
Arnie arrives to [facility] at 41 1/7 weeks with reports of contractions since 7 am this morning and a little bit of bloody show.  Oriented to room.  SVE 5-6cm/80%/-2 station.  BOW intact, vertex.  Normal bloody show.     Dr Sommer informed of patient arrival.  Into see patient.  Intrapartum orders obtained.      Saline lock inserted due to history of PPH per patient.

## 2021-12-13 NOTE — PLAN OF CARE
Problem: Adult Inpatient Plan of Care  Goal: Plan of Care Review  Outcome: Improving  Flowsheets (Taken 12/12/2021 2125)  Plan of Care Reviewed With:   patient   significant other  Outcome Summary: Labor progression WNL.  Pain to be managed in labor per patient preference, prefers no medications.  Progress: improving     Problem: Change in Fetal Wellbeing (Labor)  Goal: Stable Fetal Wellbeing  Outcome: Improving     Problem: Labor Pain (Labor)  Goal: Acceptable Pain Control  Outcome: Improving

## 2021-12-13 NOTE — PLAN OF CARE
Problem: Adult Inpatient Plan of Care  Goal: Plan of Care Review  12/13/2021 0643 by Mayuri Castle, RN  Outcome: Improving  Flowsheets  Taken 12/13/2021 0643  Plan of Care Reviewed With:   patient   significant other  Progress: improving  Taken 12/12/2021 2125  Outcome Summary: Labor progression WNL.  Pain to be managed in labor per patient preference, prefers no medications.  12/12/2021 2125 by Mayuri Castle, RN  Outcome: Improving  Flowsheets (Taken 12/12/2021 2125)  Plan of Care Reviewed With:   patient   significant other  Outcome Summary: Labor progression WNL.  Pain to be managed in labor per patient preference, prefers no medications.  Progress: improving     Problem: Pain (Postpartum Vaginal Delivery)  Goal: Acceptable Pain Control  Outcome: Improving     Problem: Bleeding (Postpartum Vaginal Delivery)  Goal: Hemostasis  Outcome: Improving     Pain to be managed at or below patient pain goal.  Bleeding to remain WNL.  TXA given before delivery and pitocin postpartum for history of PPH management.

## 2021-12-13 NOTE — PLAN OF CARE
Postpartum assessment WNL. Pain managed with PRN Ibuprofen and Tylenol. Postpartum mood assessment completed. BC and ROP need to be completed- at bedside, reviewed both forms with , patient requested to fill out tomorrow. Tdap up to date. Desires flu vaccine before discharge.     Up independently, voiding without difficulty. Attentive to infant.     Plan to discharge tomorrow.

## 2021-12-13 NOTE — H&P
[facility] Labor and Delivery History and Physical    Arnie Peguero MRN# [id number]   Age: 31 year old YOB: 1990     Date of Admission:  2021    Primary care provider: Paola German           Chief Complaint:   Arnie Peguero is a 31 year old female who is 41w1d pregnant and being admitted for active labor management.          Pregnancy history:     OBSTETRIC HISTORY:    OB History    Para Term  AB Living   4 3 3 0 0 3   SAB IAB Ectopic Multiple Live Births   0 0 0 0 3      # Outcome Date GA Lbr Berry/2nd Weight Sex Delivery Anes PTL Lv   4 Current            3 Term 18 41w0d 03:00 / 00:03 2.8 kg (6 lb 2.8 oz) F Vag-Spont None N MYNOR      Name: SEJALFEMALE-ARNIE      Apgar1: 8  Apgar5: 9   2 Term 17 40w3d 03:48 / 00:21 2.977 kg (6 lb 9 oz) F Vag-Spont None N MYNOR      Name: Kristina      Apgar1: 8  Apgar5: 9   1 Term 16 41w0d 15:56 / 00:43 2.466 kg (5 lb 7 oz) F Vag-Spont None N MYNOR      Name: Cait      Apgar1: 7  Apgar5: 8       EDC: Estimated Date of Delivery: 21    Prenatal Labs:   Lab Results   Component Value Date    AS Negative 2018    HEPBANG Negative 2018    HGB 11.3 (L) 2018       GBS Status:   Lab Results   Component Value Date    GBS NO Group B Streptococcus detected by PCR. 2021       Active Problem List  Patient Active Problem List   Diagnosis     Acne Vulgaris     Abnormal Pap smear of cervix      (normal spontaneous vaginal delivery)     Postpartum fever, current hospitalization     Encounter for triage in pregnant patient     Pregnancy       Medication Prior to Admission  Medications Prior to Admission   Medication Sig Dispense Refill Last Dose     Prenatal Vit-Fe Fumarate-FA (PNV PRENATAL PLUS MULTIVITAMIN) 27-1 MG TABS per tablet Take 1 tablet by mouth daily   2021 at Unknown time   .        Maternal Past Medical History:   History reviewed. No pertinent past medical history.                    Family  History:   This patient has no significant family history            Social History:   This patient has no significant social history         Review of Systems:   CONSTITUTIONAL: NEGATIVE for fever, chills, change in weight  ENT/MOUTH: NEGATIVE for ear, mouth and throat problems  RESP: NEGATIVE for significant cough or SOB  CV: NEGATIVE for chest pain, palpitations or peripheral edema          Physical Exam:   Vitals were reviewed  Temp: 98.4  F (36.9  C) Temp src: Oral BP: 118/71 Pulse: 77   Resp: 18 SpO2: 97 % O2 Device: None (Room air)    Lungs:   No increased work of breathing, good air exchange, clear to auscultation bilaterally, no crackles or wheezing     Cardiovascular:   Normal apical impulse, regular rate and rhythm, normal S1 and S2, no S3 or S4, and no murmur noted     Abdomen:   No scars, normal bowel sounds, soft, non-distended, non-tender, no masses palpated, no hepatosplenomegally      Cervix:   Membranes: intact   Dilation: 5   Effacement: 80%   Station:-2     Presentation:Vertex  Fetal Heart Rate Tracing: Cat 1  Tocometer: frequency q 3 minutes                       Assessment:   Arnie Peguero is a 41w1d pregnant female admitted with active labor management.          Plan:   Admit - see IP orders    Kerwin Sommer MD

## 2021-12-13 NOTE — L&D DELIVERY NOTE
OB Vaginal Delivery Note    Arnie Peguero MRN# [id number]   Age: 31 year old YOB: 1990       GA: 41w2d  GP:   Labor Complications: None   EBL:   mL  Delivery QBL:    Delivery Type: Vaginal, Spontaneous   ROM to Delivery Time: (Delivered) Minutes: 32  Benge Weight:     1 Minute 5 Minute 10 Minute   Apgar Totals:            BEV MATI ARPIT;JONO CASANOVA     Delivery Details:  Arnie Peguero, a 31 year old  female delivered a viable infant with apgars of   and  . Patient was fully dilated and pushing after   hours   minutes in active labor. Delivery was via vaginal, spontaneous  to a sterile field under none  anesthesia. Infant delivered in         MELINDA position. Anterior and posterior shoulders delivered without difficulty. The cord was clamped, cut twice and 3 vessels  were noted. Cord blood was obtained in routine fashion with the following disposition: discard .      Cord complications: nuchal   Placenta delivered at 2021 12:35 AM . Placental disposition was Hospital disposal . Fundal massage performed and fundus found to be firm.     Episiotomy: none    Perineum, vagina, cervix were inspected, and the following lacerations were noted:   Perineal lacerations: none              .    Excellent hemostasis was noted. Needle count correct. Infant and patient in delivery room in good and stable condition.        Sudhir, Female-Arnie [[id number]]    Labor Event Times    Labor onset date: 21 Onset time:  6:00 PM   Dilation complete date: 21 Complete time: 12:25 AM   Start pushing date/time: 2021 0029      Labor Events     labor?: No   steroids: None  Labor Type: AROM  Predominate monitoring during 1st stage: continuous electronic fetal monitoring     Antibiotics received during labor?: No     Rupture identifier: Sac 1  Rupture date/time: 21 0000   Rupture type: Artificial Rupture of Membranes  Fluid color: Clear  Fluid odor: Normal     Augmentation: None      Delivery/Placenta Date and Time    Delivery Date: 12/13/21 Delivery Time: 12:32 AM   Placenta Date/Time: 12/13/2021 12:35 AM  Oxytocin given at the time of delivery: after delivery of baby  Delivering clinician: Kerwin Sommer MD   Other personnel present at delivery:  Provider Role   Mayuri Castle, RN Registered Nurse   Krystian Nevarez RN Registered Nurse         Vaginal Counts     Initial count performed by 2 team members:  Two Team Members   YESI Castle R       Needles Suture Needles Sponges (RETIRED) Instruments   Initial counts 0 0 5    Added to count       Relief counts       Final counts             Placed during labor Accounted for at the end of labor   FSE No NA   IUPC No NA   Cervadil No NA                     Cord    Vessels: 3 Vessels    Cord Complications: Nuchal   Nuchal Intervention: delivered through         Nuchal cord description: loose nuchal cord         Cord Blood Disposition: Discard    Gases Sent?: No    Delayed cord clamping?: Yes    Cord Clamping Delay (seconds):  seconds       Labor Events and Shoulder Dystocia    Fetal Tracing Prior to Delivery: Category 1     Delivery (Maternal) (Provider to Complete) ([id number])    Episiotomy: None  Perineal lacerations: None    Repair suture: None  Genital tract inspection done: Pos     Blood Loss  Mother: Arnie Peguero #[id number]   Start of Mother's Information    Delivery Blood Loss  12/12/21 1800 - 12/13/21 0044    None           End of Mother's Information  Mother: Arnie Peguero #[id number]          Delivery - Provider to Complete ([id number])    Delivering clinician: Kerwin Sommer MD  Delivery Type (Choose the 1 that will go to the Birth History): Vaginal, Spontaneous                   Other personnel:  Provider Role   Mayuri Castle RN Registered Nurse   Krystian Nevarez RN Registered Nurse                Placenta    Date/Time: 12/13/2021 12:35 AM  Removal: Spontaneous  Disposition: Hospital disposal           Anesthesia     Method: None                       Kerwin Sommer MD

## 2021-12-13 NOTE — PLAN OF CARE
Problem: Pain (Postpartum Vaginal Delivery)  Goal: Acceptable Pain Control  12/13/2021 1627 by Gali Braun RN  Outcome: Completed  12/13/2021 1445 by Gali Braun RN  Outcome: No Change     Problem: Urinary Retention (Postpartum Vaginal Delivery)  Goal: Effective Urinary Elimination  12/13/2021 1627 by Gali Braun RN  Outcome: Completed  12/13/2021 1445 by Gali Braun RN  Outcome: No Change     Problem: Adjustment to Role Transition (Postpartum Vaginal Delivery)  Goal: Successful Maternal Role Transition  12/13/2021 1627 by Gali Braun RN  Outcome: Completed  12/13/2021 1445 by Gali Braun RN  Outcome: No Change  Intervention: Support Maternal Role Transition  Recent Flowsheet Documentation  Taken 12/13/2021 1055 by Gali Braun RN  Supportive Measures:   active listening utilized   self-care encouraged   verbalization of feelings encouraged  Parent/Child Attachment Promotion:   interaction encouraged   skin-to-skin contact encouraged

## 2021-12-14 VITALS
RESPIRATION RATE: 18 BRPM | DIASTOLIC BLOOD PRESSURE: 74 MMHG | HEIGHT: 55 IN | SYSTOLIC BLOOD PRESSURE: 103 MMHG | HEART RATE: 67 BPM | WEIGHT: 126 LBS | BODY MASS INDEX: 29.16 KG/M2 | TEMPERATURE: 98.4 F | OXYGEN SATURATION: 97 %

## 2021-12-14 LAB
HGB BLD-MCNC: 12.1 G/DL (ref 11.7–15.7)
T PALLIDUM AB SER QL: NONREACTIVE

## 2021-12-14 PROCEDURE — 250N000013 HC RX MED GY IP 250 OP 250 PS 637: Performed by: OBSTETRICS & GYNECOLOGY

## 2021-12-14 PROCEDURE — 90686 IIV4 VACC NO PRSV 0.5 ML IM: CPT | Performed by: OBSTETRICS & GYNECOLOGY

## 2021-12-14 PROCEDURE — G0008 ADMIN INFLUENZA VIRUS VAC: HCPCS | Performed by: OBSTETRICS & GYNECOLOGY

## 2021-12-14 PROCEDURE — 36415 COLL VENOUS BLD VENIPUNCTURE: CPT | Performed by: OBSTETRICS & GYNECOLOGY

## 2021-12-14 PROCEDURE — 85018 HEMOGLOBIN: CPT | Performed by: OBSTETRICS & GYNECOLOGY

## 2021-12-14 PROCEDURE — 250N000011 HC RX IP 250 OP 636: Performed by: OBSTETRICS & GYNECOLOGY

## 2021-12-14 RX ORDER — IBUPROFEN 600 MG/1
600 TABLET, FILM COATED ORAL EVERY 6 HOURS PRN
Qty: 60 TABLET | Refills: 0 | Status: SHIPPED | OUTPATIENT
Start: 2021-12-14

## 2021-12-14 RX ORDER — ACETAMINOPHEN 325 MG/1
650 TABLET ORAL EVERY 4 HOURS PRN
Qty: 60 TABLET | Refills: 0 | Status: SHIPPED | OUTPATIENT
Start: 2021-12-14

## 2021-12-14 RX ORDER — DOCUSATE SODIUM 100 MG/1
100 CAPSULE, LIQUID FILLED ORAL 2 TIMES DAILY PRN
Qty: 60 CAPSULE | Refills: 0 | Status: SHIPPED | OUTPATIENT
Start: 2021-12-14

## 2021-12-14 RX ADMIN — DOCUSATE SODIUM 100 MG: 100 CAPSULE, LIQUID FILLED ORAL at 09:05

## 2021-12-14 RX ADMIN — ACETAMINOPHEN 650 MG: 325 TABLET ORAL at 09:05

## 2021-12-14 RX ADMIN — IBUPROFEN 800 MG: 800 TABLET, FILM COATED ORAL at 02:10

## 2021-12-14 RX ADMIN — IBUPROFEN 800 MG: 800 TABLET, FILM COATED ORAL at 09:05

## 2021-12-14 RX ADMIN — ACETAMINOPHEN 650 MG: 325 TABLET ORAL at 02:10

## 2021-12-14 RX ADMIN — INFLUENZA A VIRUS A/VICTORIA/2570/2019 IVR-215 (H1N1) ANTIGEN (FORMALDEHYDE INACTIVATED), INFLUENZA A VIRUS A/TASMANIA/503/2020 IVR-221 (H3N2) ANTIGEN (FORMALDEHYDE INACTIVATED), INFLUENZA B VIRUS B/PHUKET/3073/2013 ANTIGEN (FORMALDEHYDE INACTIVATED), AND INFLUENZA B VIRUS B/WASHINGTON/02/2019 ANTIGEN (FORMALDEHYDE INACTIVATED) 0.5 ML: 15; 15; 15; 15 INJECTION, SUSPENSION INTRAMUSCULAR at 10:31

## 2021-12-14 NOTE — DISCHARGE SUMMARY
"HOSPITAL DISCHARGE SUMMARY - Vaginal Delivery    Patient Name: Arnie Peguero  YOB: 1990  Medical Record Number: [id number]  Primary Physician: Paola German    Admission Date:  2021  Delivery Date:   2021  Gestational Age at Delivery:  41w 2d     DISCHARGE DATE:  2021    Reason For Admission: Labor and Delivery    Diagnosis:     CONDITIONS COMPLICATION ANTEPARTUM/POSTPARTUM:  Refer to prenatal   Antepartum:None   Intrapartum: None   Postpartum: None    SIGNIFICANT DIAGNOSTIC PROCEDURES:   None    CONSULTS:   None    HISTORY OF PRESENT ILLNESS AND HOSPITAL COURSE: This is a 31 year old female who underwent Normal spontaneous vaginal delivery. Postpartum course was unremarkable. On the day that she was discharged, vital signs were stable. Her pain was controlled, she was tolerating diet. She was voiding and passing gas.     LABS:  Lab Results   Component Value Date    HGB 12.1 2021       PENDING LABS:  None    EXAM:  /74   Pulse 67   Temp 98.4  F (36.9  C) (Oral)   Resp 18   Ht 1.372 m (4' 6\")   Wt 57.2 kg (126 lb)   SpO2 97%   Breastfeeding Yes   BMI 30.38 kg/m    General: NAD  Abdomen: Soft, non-tender  Uterine fundus: Firm, non-tender  Extremities: no pain, no edema    DISPOSITION:  Home    CONDITION AT DISCHARGE:  Good/Stable    Discharge Medications:      Medication List      Started    acetaminophen 325 MG tablet  Commonly known as: TYLENOL  650 mg, Oral, EVERY 4 HOURS PRN     docusate sodium 100 MG capsule  Commonly known as: COLACE  100 mg, Oral, 2 TIMES DAILY PRN     ibuprofen 600 MG tablet  Commonly known as: ADVIL/MOTRIN  600 mg, Oral, EVERY 6 HOURS PRN            DISCHARGE PLAN:   - Follow up with [facility] in 6 weeks, unless indicated sooner  - Take medication as prescribed  - Physical activity: As tolerated, no heavy lifting. Pelvic rest.  - Diet:  Regular  - Medication:  Please see MAR  - Warning signs discussed with patient about when to call the " clinic/hospital  - All questions and concerns were answered for the patient prior to discharge.       Johann Yancey PA-C  [facility]      I saw the patient on the date of discharge  Total time spent for discharge on date of discharge: 20 minutes    Physician(s) in addition to primary physician who should receive a copy:  CC1: Dr Alexandre    Addendum:  Agree with note above    Kylah Alexandre MD  (P) [phone]

## 2021-12-14 NOTE — PLAN OF CARE
Problem: Adult Inpatient Plan of Care  Goal: Plan of Care Review  Outcome: Adequate for Discharge  All discharge education completed including danger signs and information/resources on postpartum mood disorders. Patient verbalizes understanding and denies having further questions. Follow up planned at 6 week postpartum visit or sooner if symptoms arise.

## 2021-12-14 NOTE — DISCHARGE INSTRUCTIONS
"Assessment of Breastfeeding after discharge: Is baby is getting enough to eat?    - If you answer  YES  to all these questions by day 5, you will know breastfeeding is going well.    - If you answer  NO  to any of these questions, call your baby's medical provider or the lactation clinic.   - Refer to \"Postpartum and [place] Care\" (PNC) , starting on page 35. (This is the booklet you tracked baby's feedings and diaper counts while in the hospital.)   - Please call one of our Outpatient Lactation Consultants at [phone] at any time with breastfeeding questions or concerns.    1.  My milk came in (breasts became hancock on day 3-5 after birth).  I am softening the areola using hand expression or reverse pressure softening prior to latch, as needed.  YES NO   2.  My baby breastfeeds at least 8 times in 24 hours. YES NO   3.  My baby usually gives feeding cues (answer  No  if your baby is sleepy and you need to wake baby for most feedings).  *PNC page 36   YES NO   4.  My baby latches on my breast easily.  *PNC page 37  YES NO   5.  During breastfeeding, I hear my baby frequently swallowing, (one-two sucks per swallow).  YES NO   6.  I allow my baby to drain the first breast before I offer the other side.   YES NO   7.  My baby is satisfied after breastfeeding.   *PNC page 39 YES NO   8.  My breasts feel hancock before feedings and softer after feedings. YES NO   9.  My breasts and nipples are comfortable.  I have no engorgement or cracked nipples.    *PNC Page 40 and 41  YES NO   10.  My baby is meeting the wet diaper goals each day.  *PNC page 38  YES NO   11.  My baby is meeting the soiled diaper goals each day. *PNC page 38 YES NO   12.  My baby is only getting my breast milk, no formula. YES NO   13. I know my baby needs to be back to birth weight by day 14.  YES NO   14. I know my baby will cluster feed and have growth spurts. *PNC page 39  YES NO   15.  I feel confident in breastfeeding.  If not, I know where " "to get support. YES NO      [employer] has a short video (2:47) called:   \"Bena Hold/ Asymmetric Latch \" Breastfeeding Education by JAIR.        Other websites:  www.[employer]line.ca-Breastfeeding Videos  [email or web address]--Our videos-Breastfeeding  [email or web address]    "

## 2021-12-14 NOTE — PLAN OF CARE
"  Problem: Bleeding (Postpartum Vaginal Delivery)  Goal: Hemostasis  Outcome: Improving     Problem: Pain (Postpartum Vaginal Delivery)  Goal: Acceptable Pain Control  Outcome: Improving    VSS. Bleeding light. Fundus firm on assessment. Denies any dizziness, blurry vision, or HA. Pain controlled with PRN pain medications, see MAR. Abdominal binder given for comfort. Pt up to bathroom independently. Voided.  Pt reported no milk supply. Encouraged pt to breast feed and pump. Pt would like Flu shot in AM prior to discharge home. hbg recheck in AM.     Vitals: BP 94/60 (BP Location: Right arm)   Pulse 64   Temp 97.9  F (36.6  C) (Oral)   Resp 16   Ht 1.372 m (4' 6\")   Wt 57.2 kg (126 lb)   SpO2 97%   Breastfeeding Yes   BMI 30.38 kg/m    BMI= Body mass index is 30.38 kg/m .      "

## 2021-12-14 NOTE — PROGRESS NOTES
"Vaginal Delivery Postpartum Day 1    Patient Name:  Arnie Peguero  :      1990  MRN:      [id number]          Subjective:  The patient feels well. The patient has no emotional concerns. Her pain is well controlled with current medications. The patient is ambulating well. She is voiding without difficulty and passing gas. The amount and color of the lochia is appropriate for the duration of recovery. The patient denies large clots. The baby is doing well.    Objective:  The patient has a blood pressure which is within the normal range. The uterine fundus is firm. Urinary output is adequate.     EXAM:  /74   Pulse 67   Temp 98.4  F (36.9  C) (Oral)   Resp 18   Ht 1.372 m (4' 6\")   Wt 57.2 kg (126 lb)   SpO2 97%   Breastfeeding Yes   BMI 30.38 kg/m      General: NAD  Abdomen: soft, NT  Fundus: firm, NT  Ext: no pain    Results for orders placed or performed during the hospital encounter of 21 (from the past 24 hour(s))   Hemoglobin   Result Value Ref Range    Hemoglobin 12.1 11.7 - 15.7 g/dL         Assessment:    Normal postpartum course    Plan:    - Continue current cares  - Discharge home    [facility]  Date:  2021  Time:  1:36 PM  "

## 2022-01-26 ENCOUNTER — IMMUNIZATION (OUTPATIENT)
Dept: NURSING | Facility: CLINIC | Age: 32
End: 2022-01-26
Payer: COMMERCIAL

## 2022-01-26 PROCEDURE — 0051A COVID-19,PF,PFIZER (12+ YRS): CPT

## 2022-01-26 PROCEDURE — 91305 COVID-19,PF,PFIZER (12+ YRS): CPT

## 2022-02-18 ENCOUNTER — ALLIED HEALTH/NURSE VISIT (OUTPATIENT)
Dept: FAMILY MEDICINE | Facility: CLINIC | Age: 32
End: 2022-02-18
Payer: COMMERCIAL

## 2022-02-18 ENCOUNTER — MEDICAL CORRESPONDENCE (OUTPATIENT)
Dept: HEALTH INFORMATION MANAGEMENT | Facility: CLINIC | Age: 32
End: 2022-02-18

## 2022-02-18 DIAGNOSIS — Z23 COVID-19 VACCINE ADMINISTERED: Primary | ICD-10-CM

## 2022-02-18 PROCEDURE — 0052A COVID-19,PF,PFIZER (12+ YRS): CPT

## 2022-02-18 PROCEDURE — 91305 COVID-19,PF,PFIZER (12+ YRS): CPT

## 2022-02-18 PROCEDURE — 99207 PR NO CHARGE NURSE ONLY: CPT

## 2022-06-23 NOTE — PROGRESS NOTES
No ctx, lof, bleeding, or discharge.  No HA or vision changes.  Good FM : yes    Labs/imaging reviewed: GBS negative, discussed does not need antibiotics during labor.    Plan today:   Fetal heart tones: Baseline seems to have shifted to about 120-125. Given this shift I did listen for awhile and noted good variability ranging form 114-138.  Flu shot at next visit!     follow-up in 1 week    Paola German     Yes

## 2022-09-01 ENCOUNTER — LAB REQUISITION (OUTPATIENT)
Dept: LAB | Facility: CLINIC | Age: 32
End: 2022-09-01

## 2022-09-01 DIAGNOSIS — Z11.3 ENCOUNTER FOR SCREENING FOR INFECTIONS WITH A PREDOMINANTLY SEXUAL MODE OF TRANSMISSION: ICD-10-CM

## 2022-09-01 PROCEDURE — 86780 TREPONEMA PALLIDUM: CPT | Performed by: NURSE PRACTITIONER

## 2022-09-02 LAB — T PALLIDUM AB SER QL: NONREACTIVE

## 2022-10-24 ENCOUNTER — LAB REQUISITION (OUTPATIENT)
Dept: LAB | Facility: CLINIC | Age: 32
End: 2022-10-24

## 2022-10-24 DIAGNOSIS — Z34.92 ENCOUNTER FOR SUPERVISION OF NORMAL PREGNANCY, UNSPECIFIED, SECOND TRIMESTER: ICD-10-CM

## 2022-10-24 PROCEDURE — 87653 STREP B DNA AMP PROBE: CPT | Performed by: OBSTETRICS & GYNECOLOGY

## 2022-10-25 LAB — GP B STREP DNA SPEC QL NAA+PROBE: POSITIVE

## 2022-11-25 ENCOUNTER — HOSPITAL ENCOUNTER (INPATIENT)
Facility: HOSPITAL | Age: 32
LOS: 1 days | Discharge: HOME | End: 2022-11-26
Attending: OBSTETRICS & GYNECOLOGY | Admitting: OBSTETRICS & GYNECOLOGY
Payer: COMMERCIAL

## 2022-11-25 PROBLEM — Z34.90 PREGNANT: Status: ACTIVE | Noted: 2022-11-25

## 2022-11-25 LAB
ABO/RH(D): NORMAL
ANTIBODY SCREEN: NEGATIVE
HEPATITIS B SURFACE ANTIGEN (EXTERNAL): NEGATIVE
HEPATITIS B SURFACE ANTIGEN (EXTERNAL): NEGATIVE
HIV1+2 AB SERPL QL IA: NONREACTIVE
RUBELLA ANTIBODY IGG (EXTERNAL): NORMAL
SARS-COV-2 RNA RESP QL NAA+PROBE: NEGATIVE
SPECIMEN EXPIRATION DATE: NORMAL
T PALLIDUM AB SER QL: NONREACTIVE
TREPONEMA PALLIDUM ANTIBODY (EXTERNAL): NONREACTIVE

## 2022-11-25 PROCEDURE — 86901 BLOOD TYPING SEROLOGIC RH(D): CPT | Performed by: OBSTETRICS & GYNECOLOGY

## 2022-11-25 PROCEDURE — 258N000003 HC RX IP 258 OP 636: Performed by: OBSTETRICS & GYNECOLOGY

## 2022-11-25 PROCEDURE — U0003 INFECTIOUS AGENT DETECTION BY NUCLEIC ACID (DNA OR RNA); SEVERE ACUTE RESPIRATORY SYNDROME CORONAVIRUS 2 (SARS-COV-2) (CORONAVIRUS DISEASE [COVID-19]), AMPLIFIED PROBE TECHNIQUE, MAKING USE OF HIGH THROUGHPUT TECHNOLOGIES AS DESCRIBED BY CMS-2020-01-R: HCPCS | Performed by: OBSTETRICS & GYNECOLOGY

## 2022-11-25 PROCEDURE — 36415 COLL VENOUS BLD VENIPUNCTURE: CPT | Performed by: OBSTETRICS & GYNECOLOGY

## 2022-11-25 PROCEDURE — 120N000001 HC R&B MED SURG/OB

## 2022-11-25 PROCEDURE — 59409 OBSTETRICAL CARE: CPT | Performed by: FAMILY MEDICINE

## 2022-11-25 PROCEDURE — 250N000011 HC RX IP 250 OP 636: Performed by: OBSTETRICS & GYNECOLOGY

## 2022-11-25 PROCEDURE — 86780 TREPONEMA PALLIDUM: CPT | Performed by: OBSTETRICS & GYNECOLOGY

## 2022-11-25 PROCEDURE — 722N000001 HC LABOR CARE VAGINAL DELIVERY SINGLE

## 2022-11-25 PROCEDURE — 250N000013 HC RX MED GY IP 250 OP 250 PS 637: Performed by: OBSTETRICS & GYNECOLOGY

## 2022-11-25 RX ORDER — METOCLOPRAMIDE 10 MG/1
10 TABLET ORAL EVERY 6 HOURS PRN
Status: DISCONTINUED | OUTPATIENT
Start: 2022-11-25 | End: 2022-11-25

## 2022-11-25 RX ORDER — NALOXONE HYDROCHLORIDE 0.4 MG/ML
0.2 INJECTION, SOLUTION INTRAMUSCULAR; INTRAVENOUS; SUBCUTANEOUS
Status: DISCONTINUED | OUTPATIENT
Start: 2022-11-25 | End: 2022-11-25 | Stop reason: HOSPADM

## 2022-11-25 RX ORDER — PENICILLIN G 3000000 [IU]/50ML
3 INJECTION, SOLUTION INTRAVENOUS EVERY 4 HOURS
Status: DISCONTINUED | OUTPATIENT
Start: 2022-11-25 | End: 2022-11-25 | Stop reason: HOSPADM

## 2022-11-25 RX ORDER — KETOROLAC TROMETHAMINE 30 MG/ML
30 INJECTION, SOLUTION INTRAMUSCULAR; INTRAVENOUS
Status: DISCONTINUED | OUTPATIENT
Start: 2022-11-25 | End: 2022-11-25

## 2022-11-25 RX ORDER — METHYLERGONOVINE MALEATE 0.2 MG/ML
200 INJECTION INTRAVENOUS
Status: DISCONTINUED | OUTPATIENT
Start: 2022-11-25 | End: 2022-11-25

## 2022-11-25 RX ORDER — BISACODYL 10 MG
10 SUPPOSITORY, RECTAL RECTAL DAILY PRN
Status: DISCONTINUED | OUTPATIENT
Start: 2022-11-25 | End: 2022-11-26 | Stop reason: HOSPADM

## 2022-11-25 RX ORDER — METOCLOPRAMIDE HYDROCHLORIDE 5 MG/ML
10 INJECTION INTRAMUSCULAR; INTRAVENOUS EVERY 6 HOURS PRN
Status: DISCONTINUED | OUTPATIENT
Start: 2022-11-25 | End: 2022-11-25 | Stop reason: HOSPADM

## 2022-11-25 RX ORDER — PROCHLORPERAZINE 25 MG
25 SUPPOSITORY, RECTAL RECTAL EVERY 12 HOURS PRN
Status: DISCONTINUED | OUTPATIENT
Start: 2022-11-25 | End: 2022-11-25

## 2022-11-25 RX ORDER — MISOPROSTOL 200 UG/1
800 TABLET ORAL
Status: DISCONTINUED | OUTPATIENT
Start: 2022-11-25 | End: 2022-11-25

## 2022-11-25 RX ORDER — NALOXONE HYDROCHLORIDE 0.4 MG/ML
0.4 INJECTION, SOLUTION INTRAMUSCULAR; INTRAVENOUS; SUBCUTANEOUS
Status: DISCONTINUED | OUTPATIENT
Start: 2022-11-25 | End: 2022-11-25 | Stop reason: HOSPADM

## 2022-11-25 RX ORDER — CITRIC ACID/SODIUM CITRATE 334-500MG
30 SOLUTION, ORAL ORAL
Status: DISCONTINUED | OUTPATIENT
Start: 2022-11-25 | End: 2022-11-25

## 2022-11-25 RX ORDER — CARBOPROST TROMETHAMINE 250 UG/ML
250 INJECTION, SOLUTION INTRAMUSCULAR
Status: DISCONTINUED | OUTPATIENT
Start: 2022-11-25 | End: 2022-11-25 | Stop reason: HOSPADM

## 2022-11-25 RX ORDER — ONDANSETRON 2 MG/ML
4 INJECTION INTRAMUSCULAR; INTRAVENOUS EVERY 6 HOURS PRN
Status: DISCONTINUED | OUTPATIENT
Start: 2022-11-25 | End: 2022-11-25 | Stop reason: HOSPADM

## 2022-11-25 RX ORDER — OXYTOCIN 10 [USP'U]/ML
10 INJECTION, SOLUTION INTRAMUSCULAR; INTRAVENOUS
Status: DISCONTINUED | OUTPATIENT
Start: 2022-11-25 | End: 2022-11-25 | Stop reason: HOSPADM

## 2022-11-25 RX ORDER — KETOROLAC TROMETHAMINE 30 MG/ML
30 INJECTION, SOLUTION INTRAMUSCULAR; INTRAVENOUS
Status: DISCONTINUED | OUTPATIENT
Start: 2022-11-25 | End: 2022-11-26 | Stop reason: HOSPADM

## 2022-11-25 RX ORDER — OXYTOCIN 10 [USP'U]/ML
10 INJECTION, SOLUTION INTRAMUSCULAR; INTRAVENOUS
Status: DISCONTINUED | OUTPATIENT
Start: 2022-11-25 | End: 2022-11-26 | Stop reason: HOSPADM

## 2022-11-25 RX ORDER — PENICILLIN G POTASSIUM 5000000 [IU]/1
5 INJECTION, POWDER, FOR SOLUTION INTRAMUSCULAR; INTRAVENOUS ONCE
Status: COMPLETED | OUTPATIENT
Start: 2022-11-25 | End: 2022-11-25

## 2022-11-25 RX ORDER — OXYTOCIN/0.9 % SODIUM CHLORIDE 30/500 ML
100-340 PLASTIC BAG, INJECTION (ML) INTRAVENOUS CONTINUOUS PRN
Status: DISCONTINUED | OUTPATIENT
Start: 2022-11-25 | End: 2022-11-26 | Stop reason: HOSPADM

## 2022-11-25 RX ORDER — HYDROCORTISONE 25 MG/G
CREAM TOPICAL 3 TIMES DAILY PRN
Status: DISCONTINUED | OUTPATIENT
Start: 2022-11-25 | End: 2022-11-26 | Stop reason: HOSPADM

## 2022-11-25 RX ORDER — MISOPROSTOL 200 UG/1
400 TABLET ORAL
Status: DISCONTINUED | OUTPATIENT
Start: 2022-11-25 | End: 2022-11-25 | Stop reason: HOSPADM

## 2022-11-25 RX ORDER — ONDANSETRON 2 MG/ML
4 INJECTION INTRAMUSCULAR; INTRAVENOUS EVERY 6 HOURS PRN
Status: DISCONTINUED | OUTPATIENT
Start: 2022-11-25 | End: 2022-11-25

## 2022-11-25 RX ORDER — ONDANSETRON 4 MG/1
4 TABLET, ORALLY DISINTEGRATING ORAL EVERY 6 HOURS PRN
Status: DISCONTINUED | OUTPATIENT
Start: 2022-11-25 | End: 2022-11-25 | Stop reason: HOSPADM

## 2022-11-25 RX ORDER — SODIUM CHLORIDE, SODIUM LACTATE, POTASSIUM CHLORIDE, CALCIUM CHLORIDE 600; 310; 30; 20 MG/100ML; MG/100ML; MG/100ML; MG/100ML
INJECTION, SOLUTION INTRAVENOUS CONTINUOUS
Status: DISCONTINUED | OUTPATIENT
Start: 2022-11-25 | End: 2022-11-25 | Stop reason: HOSPADM

## 2022-11-25 RX ORDER — MISOPROSTOL 200 UG/1
800 TABLET ORAL
Status: DISCONTINUED | OUTPATIENT
Start: 2022-11-25 | End: 2022-11-25 | Stop reason: HOSPADM

## 2022-11-25 RX ORDER — NALOXONE HYDROCHLORIDE 0.4 MG/ML
0.4 INJECTION, SOLUTION INTRAMUSCULAR; INTRAVENOUS; SUBCUTANEOUS
Status: DISCONTINUED | OUTPATIENT
Start: 2022-11-25 | End: 2022-11-25

## 2022-11-25 RX ORDER — OXYTOCIN/0.9 % SODIUM CHLORIDE 30/500 ML
340 PLASTIC BAG, INJECTION (ML) INTRAVENOUS CONTINUOUS PRN
Status: DISCONTINUED | OUTPATIENT
Start: 2022-11-25 | End: 2022-11-25 | Stop reason: HOSPADM

## 2022-11-25 RX ORDER — MODIFIED LANOLIN
OINTMENT (GRAM) TOPICAL
Status: DISCONTINUED | OUTPATIENT
Start: 2022-11-25 | End: 2022-11-26 | Stop reason: HOSPADM

## 2022-11-25 RX ORDER — METOCLOPRAMIDE 10 MG/1
10 TABLET ORAL EVERY 6 HOURS PRN
Status: DISCONTINUED | OUTPATIENT
Start: 2022-11-25 | End: 2022-11-25 | Stop reason: HOSPADM

## 2022-11-25 RX ORDER — PROCHLORPERAZINE MALEATE 10 MG
10 TABLET ORAL EVERY 6 HOURS PRN
Status: DISCONTINUED | OUTPATIENT
Start: 2022-11-25 | End: 2022-11-25 | Stop reason: HOSPADM

## 2022-11-25 RX ORDER — CARBOPROST TROMETHAMINE 250 UG/ML
250 INJECTION, SOLUTION INTRAMUSCULAR
Status: DISCONTINUED | OUTPATIENT
Start: 2022-11-25 | End: 2022-11-25

## 2022-11-25 RX ORDER — METHYLERGONOVINE MALEATE 0.2 MG/ML
200 INJECTION INTRAVENOUS
Status: DISCONTINUED | OUTPATIENT
Start: 2022-11-25 | End: 2022-11-26 | Stop reason: HOSPADM

## 2022-11-25 RX ORDER — CITRIC ACID/SODIUM CITRATE 334-500MG
30 SOLUTION, ORAL ORAL
Status: DISCONTINUED | OUTPATIENT
Start: 2022-11-25 | End: 2022-11-25 | Stop reason: HOSPADM

## 2022-11-25 RX ORDER — MISOPROSTOL 200 UG/1
400 TABLET ORAL
Status: DISCONTINUED | OUTPATIENT
Start: 2022-11-25 | End: 2022-11-25

## 2022-11-25 RX ORDER — NALOXONE HYDROCHLORIDE 0.4 MG/ML
0.2 INJECTION, SOLUTION INTRAMUSCULAR; INTRAVENOUS; SUBCUTANEOUS
Status: DISCONTINUED | OUTPATIENT
Start: 2022-11-25 | End: 2022-11-25

## 2022-11-25 RX ORDER — IBUPROFEN 800 MG/1
800 TABLET, FILM COATED ORAL EVERY 6 HOURS PRN
Status: DISCONTINUED | OUTPATIENT
Start: 2022-11-25 | End: 2022-11-26 | Stop reason: HOSPADM

## 2022-11-25 RX ORDER — METHYLERGONOVINE MALEATE 0.2 MG/ML
200 INJECTION INTRAVENOUS
Status: DISCONTINUED | OUTPATIENT
Start: 2022-11-25 | End: 2022-11-25 | Stop reason: HOSPADM

## 2022-11-25 RX ORDER — MISOPROSTOL 200 UG/1
800 TABLET ORAL
Status: DISCONTINUED | OUTPATIENT
Start: 2022-11-25 | End: 2022-11-26 | Stop reason: HOSPADM

## 2022-11-25 RX ORDER — OXYTOCIN/0.9 % SODIUM CHLORIDE 30/500 ML
340 PLASTIC BAG, INJECTION (ML) INTRAVENOUS CONTINUOUS PRN
Status: DISCONTINUED | OUTPATIENT
Start: 2022-11-25 | End: 2022-11-26 | Stop reason: HOSPADM

## 2022-11-25 RX ORDER — METOCLOPRAMIDE HYDROCHLORIDE 5 MG/ML
10 INJECTION INTRAMUSCULAR; INTRAVENOUS EVERY 6 HOURS PRN
Status: DISCONTINUED | OUTPATIENT
Start: 2022-11-25 | End: 2022-11-25

## 2022-11-25 RX ORDER — PROCHLORPERAZINE 25 MG
25 SUPPOSITORY, RECTAL RECTAL EVERY 12 HOURS PRN
Status: DISCONTINUED | OUTPATIENT
Start: 2022-11-25 | End: 2022-11-25 | Stop reason: HOSPADM

## 2022-11-25 RX ORDER — IBUPROFEN 800 MG/1
800 TABLET, FILM COATED ORAL
Status: DISCONTINUED | OUTPATIENT
Start: 2022-11-25 | End: 2022-11-26 | Stop reason: HOSPADM

## 2022-11-25 RX ORDER — PROCHLORPERAZINE MALEATE 10 MG
10 TABLET ORAL EVERY 6 HOURS PRN
Status: DISCONTINUED | OUTPATIENT
Start: 2022-11-25 | End: 2022-11-25

## 2022-11-25 RX ORDER — DOCUSATE SODIUM 100 MG/1
100 CAPSULE, LIQUID FILLED ORAL DAILY
Status: DISCONTINUED | OUTPATIENT
Start: 2022-11-25 | End: 2022-11-26 | Stop reason: HOSPADM

## 2022-11-25 RX ORDER — CARBOPROST TROMETHAMINE 250 UG/ML
250 INJECTION, SOLUTION INTRAMUSCULAR
Status: DISCONTINUED | OUTPATIENT
Start: 2022-11-25 | End: 2022-11-26 | Stop reason: HOSPADM

## 2022-11-25 RX ORDER — IBUPROFEN 800 MG/1
800 TABLET, FILM COATED ORAL
Status: DISCONTINUED | OUTPATIENT
Start: 2022-11-25 | End: 2022-11-25

## 2022-11-25 RX ORDER — ONDANSETRON 4 MG/1
4 TABLET, ORALLY DISINTEGRATING ORAL EVERY 6 HOURS PRN
Status: DISCONTINUED | OUTPATIENT
Start: 2022-11-25 | End: 2022-11-25

## 2022-11-25 RX ORDER — MISOPROSTOL 200 UG/1
400 TABLET ORAL
Status: DISCONTINUED | OUTPATIENT
Start: 2022-11-25 | End: 2022-11-26 | Stop reason: HOSPADM

## 2022-11-25 RX ORDER — ACETAMINOPHEN 325 MG/1
650 TABLET ORAL EVERY 4 HOURS PRN
Status: DISCONTINUED | OUTPATIENT
Start: 2022-11-25 | End: 2022-11-26 | Stop reason: HOSPADM

## 2022-11-25 RX ADMIN — SODIUM CHLORIDE, POTASSIUM CHLORIDE, SODIUM LACTATE AND CALCIUM CHLORIDE 125 ML/HR: 600; 310; 30; 20 INJECTION, SOLUTION INTRAVENOUS at 08:52

## 2022-11-25 RX ADMIN — IBUPROFEN 800 MG: 800 TABLET ORAL at 23:39

## 2022-11-25 RX ADMIN — DOCUSATE SODIUM 100 MG: 100 CAPSULE, LIQUID FILLED ORAL at 17:34

## 2022-11-25 RX ADMIN — KETOROLAC TROMETHAMINE 30 MG: 30 INJECTION, SOLUTION INTRAMUSCULAR; INTRAVENOUS at 11:04

## 2022-11-25 RX ADMIN — ACETAMINOPHEN 650 MG: 325 TABLET, FILM COATED ORAL at 20:47

## 2022-11-25 RX ADMIN — IBUPROFEN 800 MG: 800 TABLET ORAL at 17:34

## 2022-11-25 RX ADMIN — PENICILLIN G POTASSIUM 5 MILLION UNITS: 5000000 POWDER, FOR SOLUTION INTRAMUSCULAR; INTRAPLEURAL; INTRATHECAL; INTRAVENOUS at 08:52

## 2022-11-25 ASSESSMENT — ACTIVITIES OF DAILY LIVING (ADL)
ADLS_ACUITY_SCORE: 18
DIFFICULTY_EATING/SWALLOWING: NO
TOILETING_ISSUES: NO
ADLS_ACUITY_SCORE: 18
FALL_HISTORY_WITHIN_LAST_SIX_MONTHS: NO
CONCENTRATING,_REMEMBERING_OR_MAKING_DECISIONS_DIFFICULTY: NO
ADLS_ACUITY_SCORE: 18
WEAR_GLASSES_OR_BLIND: NO
ADLS_ACUITY_SCORE: 18
ADLS_ACUITY_SCORE: 18
WALKING_OR_CLIMBING_STAIRS_DIFFICULTY: NO
ADLS_ACUITY_SCORE: 18
DRESSING/BATHING_DIFFICULTY: NO
DOING_ERRANDS_INDEPENDENTLY_DIFFICULTY: NO

## 2022-11-25 NOTE — H&P
[facility] Labor and Delivery History and Physical    Arnie Post MRN# [id number]   Age: 32 year old YOB: 1990     Date of Admission:  2022    Primary care provider: Paola German           Chief Complaint:   Arnie Post is a 32 year old female who is 40w6d pregnant and being admitted for active labor management.          Pregnancy history:     OBSTETRIC HISTORY:    OB History    Para Term  AB Living   5 5 5 0 0 5   SAB IAB Ectopic Multiple Live Births   0 0 0 0 5      # Outcome Date GA Lbr Berry/2nd Weight Sex Delivery Anes PTL Lv   5 Term 22 40w6d 02:28 / 00:08 2.96 kg (6 lb 8.4 oz) F Vag-Spont None N MYNOR      Name: PEDRO POST      Apgar1: 8  Apgar5: 9   4 Term 21 41w2d 06:25 / 00:07 3.21 kg (7 lb 1.2 oz) F Vag-Spont None N MYNOR      Name: PEDRO POST      Apgar1: 9  Apgar5: 9   3 Term 18 41w0d 03:00 / 00:03 2.8 kg (6 lb 2.8 oz) F Vag-Spont None N MYNOR      Name: PEDRO POST      Apgar1: 8  Apgar5: 9   2 Term 17 40w3d 03:48 / 00:21 2.977 kg (6 lb 9 oz) F Vag-Spont None N MYNOR      Name: Kristina      Apgar1: 8  Apgar5: 9   1 Term 16 41w0d 15:56 / 00:43 2.466 kg (5 lb 7 oz) F Vag-Spont None N MYNOR      Name: Cait      Apgar1: 7  Apgar5: 8       EDC: Estimated Date of Delivery: 22    Prenatal Labs:   Lab Results   Component Value Date    AS Negative 2022    HEPBANG Negative 2018    HGB 12.1 2021       GBS Status:   Lab Results   Component Value Date    GBS NO Group B Streptococcus detected by PCR. 2021       Active Problem List  Patient Active Problem List   Diagnosis     Acne Vulgaris     Abnormal Pap smear of cervix      (normal spontaneous vaginal delivery)     Postpartum fever, current hospitalization     Encounter for triage in pregnant patient     Pregnancy     Pregnant       Medication Prior to Admission  Medications Prior to Admission   Medication Sig Dispense Refill Last Dose     acetaminophen  "(TYLENOL) 325 MG tablet Take 2 tablets (650 mg) by mouth every 4 hours as needed for mild pain or fever (greater than or equal to 38  C /100.4  F (oral) or 38.5  C/ 101.4  F (core).) 60 tablet 0      docusate sodium (COLACE) 100 MG capsule Take 1 capsule (100 mg) by mouth 2 times daily as needed for constipation 60 capsule 0      ibuprofen (ADVIL/MOTRIN) 600 MG tablet Take 1 tablet (600 mg) by mouth every 6 hours as needed for other (For mild to moderate pain.) 60 tablet 0      Prenatal Vit-Fe Fumarate-FA (PNV PRENATAL PLUS MULTIVITAMIN) 27-1 MG TABS per tablet Take 1 tablet by mouth daily      .        Maternal Past Medical History:   No past medical history on file.                    Family History:     Family History   Problem Relation Age of Onset     No Known Problems Mother      No Known Problems Father      No Known Problems Sister      No Known Problems Brother      No Known Problems Brother      No Known Problems Brother      No Known Problems Brother      No Known Problems Brother      No Known Problems Sister      No Known Problems Sister      No Known Problems Sister      Family history             Social History:     Social History     Tobacco Use     Smoking status: Never     Smokeless tobacco: Never   Substance Use Topics     Alcohol use: No            Review of Systems:   The Review of Systems is negative other than noted in the HPI          Physical Exam:     Vitals were reviewed  Patient Vitals for the past 8 hrs:   BP Temp Temp src Resp Height Weight   11/25/22 1216 112/61 -- -- -- -- --   11/25/22 1201 109/67 -- -- -- -- --   11/25/22 1116 105/63 -- -- -- -- --   11/25/22 1101 111/74 98.6  F (37  C) Oral 20 -- --   11/25/22 1046 114/75 -- -- -- -- --   11/25/22 0748 118/77 -- -- -- -- --   11/25/22 0746 -- 98.8  F (37.1  C) -- 20 1.372 m (4' 6\") 58.1 kg (128 lb)     Constitutional:   awake, alert, cooperative, no apparent distress, and appears stated age     Lungs:   No increased work of " breathing, good air exchange, clear to auscultation bilaterally, no crackles or wheezing     Cardiovascular:   Normal apical impulse, regular rate and rhythm, normal S1 and S2, no S3 or S4, and no murmur noted     Abdomen:   No scars, normal bowel sounds, soft, non-distended, non-tender, no masses palpated, no hepatosplenomegally and gravid      Cervix:   Membranes: intact   Dilation: 8   Effacement: 80%   Station:-2   Consistency: soft   Position: Anterior  Presentation:Cephalic  Fetal Heart Rate Tracing: Tier 1 (normal)  Tocometer: frequency q 4-5 minutes                       Assessment:   Arnie Peguero is a 40w6d pregnant female admitted with active labor management.          Plan:   Admit - see IP orders  Anticipate     Danette Mccoy MD

## 2022-11-25 NOTE — PLAN OF CARE
Problem: Labor  Goal: Hemostasis  Outcome: Met     Problem: Labor  Goal: Effective Progression to Delivery  Outcome: Met     Problem: Labor  Goal: Absence of Infection Signs and Symptoms  Outcome: Met     Problem: Labor  Goal: Acceptable Pain Control  Outcome: Met

## 2022-11-25 NOTE — PLAN OF CARE
Problem: Labor  Goal: Stable Fetal Wellbeing  Outcome: Met     Problem: Labor  Goal: Effective Progression to Delivery  Outcome: Met     Problem: Labor  Goal: Absence of Infection Signs and Symptoms  Outcome: Met

## 2022-11-26 VITALS
WEIGHT: 128 LBS | RESPIRATION RATE: 16 BRPM | HEIGHT: 55 IN | SYSTOLIC BLOOD PRESSURE: 84 MMHG | HEART RATE: 63 BPM | DIASTOLIC BLOOD PRESSURE: 54 MMHG | BODY MASS INDEX: 29.62 KG/M2 | TEMPERATURE: 98.5 F | OXYGEN SATURATION: 97 %

## 2022-11-26 PROCEDURE — 250N000013 HC RX MED GY IP 250 OP 250 PS 637: Performed by: OBSTETRICS & GYNECOLOGY

## 2022-11-26 RX ADMIN — DOCUSATE SODIUM 100 MG: 100 CAPSULE, LIQUID FILLED ORAL at 08:01

## 2022-11-26 RX ADMIN — IBUPROFEN 800 MG: 800 TABLET ORAL at 12:07

## 2022-11-26 RX ADMIN — IBUPROFEN 800 MG: 800 TABLET ORAL at 06:08

## 2022-11-26 ASSESSMENT — ACTIVITIES OF DAILY LIVING (ADL)
ADLS_ACUITY_SCORE: 18

## 2022-11-26 NOTE — DISCHARGE SUMMARY
HOSPITAL DISCHARGE SUMMARY - SURGERY    NAME: Arnie Peguero  : 1990   MRN: [id number]    PCP: Paola German    ADMISSION DATE: 2022    DISCHARGE DATE: 2022     PRINCIPAL DISCHARGE DIAGNOSIS:   S/P     PROCEDURES PERFORMED DURING HOSPITALIZATION:        BRIEF HISTORY OF PRESENT ILLNESS AND HOSPITAL COURSE:  This is a  32 year old female admitted for labor. Patient underwent .  Patient tolerated the procedure well. Post operative course has been unremarkable. On day of discharge, her pain is controlled with current oral medications and is tolerating diet. She is voiding appropriately and is passing gas.     LABS:  Lab Results   Component Value Date    HGB 12.1 2021       PENDING LABS:  Pathology    DISPOSITION:  home    DISCHARGE CONDITION: Good/Stable    DISCHARGE PLAN:   - Follow up with  [facility] in 1-2 weeks   - Take medication as prescribed  - Physical activity: As tolerated, no heavy lifting. Pelvic rest.  - Diet:  Regular  - Medication:  Please see MAR  - Warning signs discussed with patient about when to call the clinic/hospital  - All questions and concerns were answered for the patient prior to discharge.       Naty Goldberg DO, FACOG  2022 10:52 AM        I saw the patient on the date of discharge            ?

## 2022-11-26 NOTE — PLAN OF CARE
Problem: Plan of Care - These are the overarching goals to be used throughout the patient stay.    Goal: Plan of Care Review  Description: The Plan of Care Review/Shift note should be completed every shift.  The Outcome Evaluation is a brief statement about your assessment that the patient is improving, declining, or no change.  This information will be displayed automatically on your shift note.  Outcome: Met  Flowsheets (Taken 11/26/2022 0051)  Plan of Care Reviewed With:   patient   spouse   Reviewed education and discharge education with Arnie and her  with interpeter ipad.  All questions addressed.  OK to discharge at this time.

## 2023-03-16 ENCOUNTER — MEDICAL CORRESPONDENCE (OUTPATIENT)
Dept: HEALTH INFORMATION MANAGEMENT | Facility: CLINIC | Age: 33
End: 2023-03-16
Payer: COMMERCIAL

## 2025-04-18 ENCOUNTER — OFFICE VISIT (OUTPATIENT)
Dept: FAMILY MEDICINE | Facility: CLINIC | Age: 35
End: 2025-04-18
Payer: COMMERCIAL

## 2025-04-18 VITALS
HEIGHT: 58 IN | DIASTOLIC BLOOD PRESSURE: 60 MMHG | OXYGEN SATURATION: 98 % | TEMPERATURE: 98.3 F | BODY MASS INDEX: 23.72 KG/M2 | HEART RATE: 55 BPM | RESPIRATION RATE: 21 BRPM | WEIGHT: 113 LBS | SYSTOLIC BLOOD PRESSURE: 98 MMHG

## 2025-04-18 DIAGNOSIS — R50.9 FEVER, UNSPECIFIED FEVER CAUSE: Primary | ICD-10-CM

## 2025-04-18 LAB
DEPRECATED S PYO AG THROAT QL EIA: NEGATIVE
FLUAV RNA SPEC QL NAA+PROBE: NEGATIVE
FLUBV RNA RESP QL NAA+PROBE: NEGATIVE
RSV RNA SPEC NAA+PROBE: NEGATIVE
S PYO DNA THROAT QL NAA+PROBE: NOT DETECTED
SARS-COV-2 RNA RESP QL NAA+PROBE: NEGATIVE

## 2025-04-18 PROCEDURE — 87637 SARSCOV2&INF A&B&RSV AMP PRB: CPT | Performed by: FAMILY MEDICINE

## 2025-04-18 PROCEDURE — 3078F DIAST BP <80 MM HG: CPT | Performed by: FAMILY MEDICINE

## 2025-04-18 PROCEDURE — 87651 STREP A DNA AMP PROBE: CPT | Performed by: FAMILY MEDICINE

## 2025-04-18 PROCEDURE — 99213 OFFICE O/P EST LOW 20 MIN: CPT | Performed by: FAMILY MEDICINE

## 2025-04-18 PROCEDURE — G2211 COMPLEX E/M VISIT ADD ON: HCPCS | Performed by: FAMILY MEDICINE

## 2025-04-18 PROCEDURE — 3074F SYST BP LT 130 MM HG: CPT | Performed by: FAMILY MEDICINE

## 2025-04-18 RX ORDER — ACETAMINOPHEN 500 MG
500-1000 TABLET ORAL EVERY 6 HOURS PRN
Qty: 120 TABLET | Refills: 0 | Status: SHIPPED | OUTPATIENT
Start: 2025-04-18

## 2025-04-18 RX ORDER — ACETAMINOPHEN 325 MG/1
650 TABLET ORAL EVERY 4 HOURS PRN
Qty: 60 TABLET | Refills: 0 | Status: CANCELLED | OUTPATIENT
Start: 2025-04-18

## 2025-04-18 RX ORDER — VITAMIN A ACETATE, .BETA.-CAROTENE, ASCORBIC ACID, CHOLECALCIFEROL, .ALPHA.-TOCOPHEROL ACETATE, DL-, THIAMINE MONONITRATE, RIBOFLAVIN, NIACINAMIDE, PYRIDOXINE HYDROCHLORIDE, FOLIC ACID, CYANOCOBALAMIN, CALCIUM CARBONATE, FERROUS FUMARATE, ZINC OXIDE, AND CUPRIC OXIDE 2000; 2000; 120; 400; 22; 1.84; 3; 20; 10; 1; 12; 200; 27; 25; 2 [IU]/1; [IU]/1; MG/1; [IU]/1; MG/1; MG/1; MG/1; MG/1; MG/1; MG/1; UG/1; MG/1; MG/1; MG/1; MG/1
1 TABLET ORAL DAILY
Qty: 100 TABLET | Refills: 3 | Status: SHIPPED | OUTPATIENT
Start: 2025-04-18 | End: 2025-04-18

## 2025-04-18 ASSESSMENT — ENCOUNTER SYMPTOMS: FEVER: 1

## 2025-04-18 NOTE — LETTER
2025    Arnie Sudhir   1990        To Whom it May Concern;    Please excuse Arnie Sudhir from work/school from April 15, 2025 to 2025 due to illness.     Sincerely,        Guido Vega MD

## 2025-04-18 NOTE — PROGRESS NOTES
Prior to immunization administration, verified patients identity using patient s name and date of birth. Please see Immunization Activity for additional information.     Screening Questionnaire for Adult Immunization    Are you sick today?   No   Do you have allergies to medications, food, a vaccine component or latex?   No   Have you ever had a serious reaction after receiving a vaccination?   No   Do you have a long-term health problem with heart, lung, kidney, or metabolic disease (e.g., diabetes), asthma, a blood disorder, no spleen, complement component deficiency, a cochlear implant, or a spinal fluid leak?  Are you on long-term aspirin therapy?   No   Do you have cancer, leukemia, HIV/AIDS, or any other immune system problem?   No   Do you have a parent, brother, or sister with an immune system problem?   No   In the past 3 months, have you taken medications that affect  your immune system, such as prednisone, other steroids, or anticancer drugs; drugs for the treatment of rheumatoid arthritis, Crohn s disease, or psoriasis; or have you had radiation treatments?   No   Have you had a seizure, or a brain or other nervous system problem?   No   During the past year, have you received a transfusion of blood or blood    products, or been given immune (gamma) globulin or antiviral drug?   No   For women: Are you pregnant or is there a chance you could become       pregnant during the next month?   No   Have you received any vaccinations in the past 4 weeks?   No     Immunization questionnaire answers were all negative.      Patient instructed to remain in clinic for 15 minutes afterwards, and to report any adverse reactions.     Screening performed by Catracho Ann MA on 4/18/2025 at 11:30 AM.

## 2025-04-18 NOTE — PROGRESS NOTES
"  {PROVIDER CHARTING PREFERENCE:172786}    Subjective   Arnie is a 34 year old, presenting for the following health issues:  Fever (4 days now)      4/18/2025    11:24 AM   Additional Questions   Roomed by reg   Accompanied by self         4/18/2025   Forms   Any forms needing to be completed Yes     Fever  Associated symptoms include a fever.   History of Present Illness       Reason for visit:  Fever  Symptom onset:  3-7 days ago  Symptoms include:  Fever  Symptom intensity:  Mild  Symptom progression:  Improving  Had these symptoms before:  No  What makes it worse:  Unsure  What makes it better:  Rest and sleeping   She is taking medications regularly.        {MA/LPN/RN Pre-Provider Visit Orders- hCG/UA/Strep (Optional):837634}  {SUPERLIST (Optional):377150}  {additonal problems for provider to add (Optional):692674}    {ROS Picklists (Optional):037937}      Objective    BP 98/60 (BP Location: Left arm, Patient Position: Sitting, Cuff Size: Adult Regular)   Pulse 55   Temp 98.3  F (36.8  C) (Temporal)   Resp 21   Ht 1.475 m (4' 10.07\")   Wt 51.3 kg (113 lb)   LMP 03/28/2025 (Approximate)   SpO2 98%   Breastfeeding Unknown   BMI 23.56 kg/m    Body mass index is 23.56 kg/m .  Physical Exam   {Exam List (Optional):083116}    {Diagnostic Test Results (Optional):190535}        Signed Electronically by: Guido Vega MD  {Email feedback regarding this note to [email or web address]   :303923}  " She thinks her kids are well. No ear pain. Nonsmoker.   Has not checked any tests at home, was wondering if she should do a covid test.   She has not taken any medicine though she might have tylenol or ibuprofen at home.     No past medical history on file.  No past surgical history on file.  Family History   Problem Relation Age of Onset    No Known Problems Mother     No Known Problems Father     No Known Problems Sister     No Known Problems Brother     No Known Problems Brother     No Known Problems Brother     No Known Problems Brother     No Known Problems Brother     No Known Problems Sister     No Known Problems Sister     No Known Problems Sister      Social History     Socioeconomic History    Marital status:      Spouse name: Not on file    Number of children: 1    Years of education: high school    Highest education level: Not on file   Occupational History    Not on file   Tobacco Use    Smoking status: Never    Smokeless tobacco: Never   Substance and Sexual Activity    Alcohol use: No    Drug use: Never    Sexual activity: Yes     Partners: Male     Birth control/protection: None   Other Topics Concern    Not on file   Social History Narrative    Not on file     Social Drivers of Health     Financial Resource Strain: Not on file   Food Insecurity: Not on file   Transportation Needs: Not on file   Physical Activity: Not on file   Stress: Not on file   Social Connections: Not on file   Interpersonal Safety: Low Risk  (4/18/2025)    Interpersonal Safety     Do you feel physically and emotionally safe where you currently live?: Yes     Within the past 12 months, have you been hit, slapped, kicked or otherwise physically hurt by someone?: No     Within the past 12 months, have you been humiliated or emotionally abused in other ways by your partner or ex-partner?: No   Housing Stability: Not on file             Objective    BP 98/60 (BP Location: Left arm, Patient Position: Sitting, Cuff Size: Adult  "Regular)   Pulse 55   Temp 98.3  F (36.8  C) (Temporal)   Resp 21   Ht 1.475 m (4' 10.07\")   Wt 51.3 kg (113 lb)   LMP 03/28/2025 (Approximate)   SpO2 98%   Breastfeeding Unknown   BMI 23.56 kg/m    Body mass index is 23.56 kg/m .  Physical Exam   GENERAL: alert and no distress  EYES: Eyes grossly normal to inspection, PERRL and conjunctivae and sclerae normal  HENT: ear canals and TM's normal, nose and mouth without ulcers or lesions  NECK: no adenopathy, no asymmetry, masses, or scars  RESP: lungs clear to auscultation - no rales, rhonchi or wheezes  CV: regular rate and rhythm, normal S1 S2, no S3 or S4, no murmur, click or rub, no peripheral edema  MS: no gross musculoskeletal defects noted, no edema  NEURO: Normal strength and tone, mentation intact and speech normal  PSYCH: mentation appears normal, affect normal/bright    Results for orders placed or performed in visit on 04/18/25   Influenza A/B, RSV and SARS-CoV2 PCR (COVID-19) Nose     Status: Normal    Specimen: Nose; Swab   Result Value Ref Range    Influenza A PCR Negative Negative    Influenza B PCR Negative Negative    RSV PCR Negative Negative    SARS CoV2 PCR Negative Negative    Narrative    Testing was performed using the Xpert Xpress CoV2/Flu/RSV Assay on the [employer] GeneXpert Instrument. This test should be ordered for the detection of SARS-CoV2, influenza, and RSV viruses in individuals with signs and symptoms of respiratory tract infection. This test is for in vitro diagnostic use under the [place] FDA for laboratories certified under CLIA to perform high or moderate complexity testing. This test has been [place] FDA cleared. A negative result does not rule out the presence of PCR inhibitors in the specimen or target RNA in concentration below the limit of detection for the assay. If only one viral target is positive but coinfection with multiple targets is suspected, the sample should be re-tested with another FDA cleared, approved, or " authorized test, if coninfection would change clinical management. This test was validated by the [facility]. These laboratories are certified under the Clinical Laboratory Improvement Amendments of 1988 (CLIA-88) as qualified to perfom high complexity laboratory testing.   Streptococcus A Rapid Screen w/Reflex to PCR - Clinic Collect     Status: Normal    Specimen: Throat; Swab   Result Value Ref Range    Group A Strep antigen Negative Negative   Group A Streptococcus PCR Throat Swab     Status: Normal    Specimen: Throat; Swab   Result Value Ref Range    Group A strep by PCR Not Detected Not Detected    Narrative    The Xpert Xpress Strep A test, performed on the [employer], is a rapid, qualitative in vitro diagnostic test for the detection of Streptococcus pyogenes (Group A ß-hemolytic Streptococcus, Strep A) in throat swab specimens from patients with signs and symptoms of pharyngitis. The Xpert Xpress Strep A test can be used as an aid in the diagnosis of Group A Streptococcal pharyngitis. The assay is not intended to monitor treatment for Group A Streptococcus infections. The Xpert Xpress Strep A test utilizes an automated real-time polymerase chain reaction (PCR) to detect Streptococcus pyogenes DNA.           Signed Electronically by: Guido Vega MD

## 2025-04-23 ENCOUNTER — TELEPHONE (OUTPATIENT)
Dept: FAMILY MEDICINE | Facility: CLINIC | Age: 35
End: 2025-04-23
Payer: COMMERCIAL

## 2025-04-23 NOTE — TELEPHONE ENCOUNTER
----- Message from Guido Vega sent at 4/22/2025  4:28 PM CDT -----  Please call. All her tests were negative. I hope she feels better soon.

## 2025-04-23 NOTE — TELEPHONE ENCOUNTER
Called and left message to return call in clinic. If patient returns calls please relay message. #1